# Patient Record
Sex: FEMALE | Race: WHITE | Employment: FULL TIME | URBAN - NONMETROPOLITAN AREA
[De-identification: names, ages, dates, MRNs, and addresses within clinical notes are randomized per-mention and may not be internally consistent; named-entity substitution may affect disease eponyms.]

---

## 2017-02-07 ENCOUNTER — OFFICE VISIT (OUTPATIENT)
Dept: PRIMARY CARE CLINIC | Age: 27
End: 2017-02-07

## 2017-02-07 VITALS
DIASTOLIC BLOOD PRESSURE: 70 MMHG | WEIGHT: 125.8 LBS | HEIGHT: 61 IN | SYSTOLIC BLOOD PRESSURE: 110 MMHG | HEART RATE: 83 BPM | BODY MASS INDEX: 23.75 KG/M2 | OXYGEN SATURATION: 98 %

## 2017-02-07 DIAGNOSIS — L23.9 ALLERGIC DERMATITIS: Primary | ICD-10-CM

## 2017-02-07 PROCEDURE — 99202 OFFICE O/P NEW SF 15 MIN: CPT | Performed by: FAMILY MEDICINE

## 2017-02-07 RX ORDER — METHYLPREDNISOLONE 4 MG/1
4 TABLET ORAL SEE ADMIN INSTRUCTIONS
Qty: 1 KIT | Refills: 0 | Status: SHIPPED | OUTPATIENT
Start: 2017-02-07 | End: 2017-02-13

## 2017-02-07 RX ORDER — LORATADINE 10 MG/1
10 TABLET ORAL DAILY PRN
Qty: 15 TABLET | Refills: 0 | Status: SHIPPED | OUTPATIENT
Start: 2017-02-07 | End: 2017-02-22

## 2017-02-07 RX ORDER — DULOXETIN HYDROCHLORIDE 30 MG/1
CAPSULE, DELAYED RELEASE ORAL
COMMUNITY
Start: 2016-11-14 | End: 2019-03-25

## 2017-02-24 ENCOUNTER — TELEPHONE (OUTPATIENT)
Dept: OPTOMETRY | Age: 27
End: 2017-02-24

## 2017-06-21 ENCOUNTER — HOSPITAL ENCOUNTER (EMERGENCY)
Age: 27
Discharge: HOME | End: 2017-06-22
Attending: EMERGENCY MEDICINE
Payer: COMMERCIAL

## 2017-06-21 VITALS
SYSTOLIC BLOOD PRESSURE: 113 MMHG | OXYGEN SATURATION: 100 % | TEMPERATURE: 102 F | HEIGHT: 62 IN | RESPIRATION RATE: 14 BRPM | DIASTOLIC BLOOD PRESSURE: 62 MMHG | WEIGHT: 123 LBS | HEART RATE: 99 BPM | BODY MASS INDEX: 22.63 KG/M2

## 2017-06-21 DIAGNOSIS — J02.9 ACUTE PHARYNGITIS, UNSPECIFIED ETIOLOGY: Primary | ICD-10-CM

## 2017-06-21 PROCEDURE — 6370000000 HC RX 637 (ALT 250 FOR IP): Performed by: EMERGENCY MEDICINE

## 2017-06-21 PROCEDURE — 99283 EMERGENCY DEPT VISIT LOW MDM: CPT

## 2017-06-21 RX ORDER — IBUPROFEN 600 MG/1
600 TABLET ORAL ONCE
Status: COMPLETED | OUTPATIENT
Start: 2017-06-22 | End: 2017-06-21

## 2017-06-21 RX ADMIN — IBUPROFEN 600 MG: 600 TABLET, FILM COATED ORAL at 23:51

## 2017-06-21 ASSESSMENT — PAIN DESCRIPTION - FREQUENCY: FREQUENCY: CONTINUOUS

## 2017-06-21 ASSESSMENT — PAIN DESCRIPTION - PROGRESSION: CLINICAL_PROGRESSION: GRADUALLY WORSENING

## 2017-06-21 ASSESSMENT — PAIN SCALES - GENERAL
PAINLEVEL_OUTOF10: 7
PAINLEVEL_OUTOF10: 7

## 2017-06-21 ASSESSMENT — PAIN DESCRIPTION - PAIN TYPE: TYPE: ACUTE PAIN

## 2017-06-21 ASSESSMENT — PAIN DESCRIPTION - DESCRIPTORS: DESCRIPTORS: ACHING

## 2017-06-21 ASSESSMENT — PAIN DESCRIPTION - ONSET: ONSET: SUDDEN

## 2017-06-22 VITALS
HEART RATE: 97 BPM | DIASTOLIC BLOOD PRESSURE: 66 MMHG | TEMPERATURE: 98.6 F | OXYGEN SATURATION: 98 % | RESPIRATION RATE: 18 BRPM | SYSTOLIC BLOOD PRESSURE: 113 MMHG

## 2017-06-22 DIAGNOSIS — J02.9 ACUTE PHARYNGITIS, UNSPECIFIED ETIOLOGY: Primary | ICD-10-CM

## 2017-06-22 LAB
DIRECT EXAM: NORMAL
Lab: NORMAL
Lab: NORMAL
SPECIMEN DESCRIPTION: NORMAL
SPECIMEN DESCRIPTION: NORMAL
STATUS: NORMAL
STATUS: NORMAL

## 2017-06-22 PROCEDURE — 6370000000 HC RX 637 (ALT 250 FOR IP): Performed by: EMERGENCY MEDICINE

## 2017-06-22 PROCEDURE — 99282 EMERGENCY DEPT VISIT SF MDM: CPT

## 2017-06-22 PROCEDURE — 87651 STREP A DNA AMP PROBE: CPT

## 2017-06-22 RX ORDER — PENICILLIN V POTASSIUM 250 MG/1
TABLET ORAL
Status: DISCONTINUED
Start: 2017-06-22 | End: 2017-06-22 | Stop reason: HOSPADM

## 2017-06-22 RX ORDER — PENICILLIN V POTASSIUM 500 MG/1
500 TABLET ORAL 4 TIMES DAILY
Qty: 28 TABLET | Refills: 0 | Status: SHIPPED | OUTPATIENT
Start: 2017-06-22 | End: 2017-06-29

## 2017-06-22 RX ORDER — PREDNISONE 20 MG/1
60 TABLET ORAL ONCE
Status: COMPLETED | OUTPATIENT
Start: 2017-06-22 | End: 2017-06-22

## 2017-06-22 RX ORDER — PREDNISONE 50 MG/1
50 TABLET ORAL DAILY
Qty: 4 TABLET | Refills: 0 | Status: SHIPPED | OUTPATIENT
Start: 2017-06-22 | End: 2017-06-26

## 2017-06-22 RX ORDER — PENICILLIN V POTASSIUM 500 MG/1
500 TABLET ORAL 2 TIMES DAILY
Qty: 20 TABLET | Refills: 0 | Status: SHIPPED | OUTPATIENT
Start: 2017-06-22 | End: 2017-07-02

## 2017-06-22 RX ORDER — PENICILLIN V POTASSIUM 250 MG/1
500 TABLET ORAL ONCE
Status: COMPLETED | OUTPATIENT
Start: 2017-06-22 | End: 2017-06-22

## 2017-06-22 RX ADMIN — PREDNISONE 60 MG: 20 TABLET ORAL at 23:30

## 2017-06-22 RX ADMIN — PENICILLIN V POTASIUM 500 MG: 250 TABLET ORAL at 00:15

## 2017-06-22 ASSESSMENT — PAIN SCALES - GENERAL
PAINLEVEL_OUTOF10: 4
PAINLEVEL_OUTOF10: 5
PAINLEVEL_OUTOF10: 5

## 2017-06-22 ASSESSMENT — ENCOUNTER SYMPTOMS
VOMITING: 0
NAUSEA: 0
SHORTNESS OF BREATH: 0
SORE THROAT: 1

## 2017-06-22 ASSESSMENT — PAIN DESCRIPTION - PAIN TYPE: TYPE: ACUTE PAIN

## 2017-06-22 ASSESSMENT — PAIN DESCRIPTION - FREQUENCY: FREQUENCY: CONTINUOUS

## 2017-06-22 ASSESSMENT — PAIN - FUNCTIONAL ASSESSMENT: PAIN_FUNCTIONAL_ASSESSMENT: 0-10

## 2017-06-22 ASSESSMENT — PAIN DESCRIPTION - LOCATION: LOCATION: THROAT

## 2017-11-01 ENCOUNTER — TELEPHONE (OUTPATIENT)
Dept: OPTOMETRY | Age: 27
End: 2017-11-01

## 2017-11-06 ENCOUNTER — OFFICE VISIT (OUTPATIENT)
Dept: OPTOMETRY | Age: 27
End: 2017-11-06
Payer: COMMERCIAL

## 2017-11-06 DIAGNOSIS — H52.203 MYOPIA OF BOTH EYES WITH ASTIGMATISM: Primary | ICD-10-CM

## 2017-11-06 DIAGNOSIS — H52.13 MYOPIA OF BOTH EYES WITH ASTIGMATISM: Primary | ICD-10-CM

## 2017-11-06 PROCEDURE — 92014 COMPRE OPH EXAM EST PT 1/>: CPT | Performed by: OPTOMETRIST

## 2017-11-06 RX ORDER — BENOXINATE HCL/FLUORESCEIN SOD 0.4%-0.25%
1 DROPS OPHTHALMIC (EYE) ONCE
Status: COMPLETED | OUTPATIENT
Start: 2017-11-06 | End: 2017-11-06

## 2017-11-06 RX ADMIN — Medication 1 DROP: at 17:06

## 2017-11-06 ASSESSMENT — REFRACTION_WEARINGRX
OS_SPHERE: -1.25
OD_SPHERE: -1.25
OS_CYLINDER: -0.50
OS_AXIS: 170
OD_AXIS: 180
OD_CYLINDER: -0.50

## 2017-11-06 ASSESSMENT — TONOMETRY
OS_IOP_MMHG: 18
OD_IOP_MMHG: 18
IOP_METHOD: APPLANATION W FLURESS DROP

## 2017-11-06 ASSESSMENT — REFRACTION_MANIFEST
OS_SPHERE: -1.25
OS_AXIS: 170
OD_SPHERE: -1.50
OD_CYLINDER: -0.50
OS_CYLINDER: -0.50
OD_AXIS: 180

## 2017-11-06 ASSESSMENT — VISUAL ACUITY
OD_CC+: -1
OS_CC: 20/20
CORRECTION_TYPE: CONTACTS
METHOD: SNELLEN - LINEAR

## 2017-11-06 ASSESSMENT — REFRACTION_CURRENTRX
OD_BRAND: VISTAKON: ACUVUE OASYS
OS_BRAND: VISTAKON: ACUVUE OASYS
OS_BASECURVE: 8.4
OD_BASECURVE: 8.4
OD_SPHERE: -1.75
OS_SPHERE: -1.75

## 2017-11-06 ASSESSMENT — SLIT LAMP EXAM - LIDS
COMMENTS: NORMAL
COMMENTS: NORMAL

## 2017-11-06 ASSESSMENT — KERATOMETRY
OS_K1POWER_DIOPTERS: 44.25
OS_AXISANGLE2_DEGREES: 177
OS_K2POWER_DIOPTERS: 45.25
OS_AXISANGLE_DEGREES: 087

## 2017-11-06 NOTE — PROGRESS NOTES
Elsy Das presents today for   Chief Complaint   Patient presents with    Blurred Vision    Vision Exam   .    HPI     Blurred Vision   In both eyes. Vision is blurred. Context:  distance vision. Comments   Last Vision Exam: 9-8-2016 Aw  Last Ophthalmology Exam: n/a  Last Filled Glasses Rx: ? Insurance: [employer]  Update: Contacts; was given a trial pair on Wednesday  Does not like the Acuvue Oasys cotnacts; she is able to wear them for a day then they get all foggy. Distance is all blurry.                     Main Ophthalmology Exam     Slit Lamp Exam       Right Left    Lids/Lashes Normal Normal    Conjunctiva/Sclera White and quiet White and quiet    Cornea Clear Clear    Anterior Chamber Deep and quiet Deep and quiet    Iris Round and reactive Round and reactive    Lens Clear Clear    Vitreous Normal Normal          Fundus Exam       Right Left    Disc Normal Normal    C/D Ratio 0.15 0.15    Macula Normal Normal    Vessels Normal Normal    78 diopter                   Tonometry     Tonometry (Applanation w Fluress drop, 5:14 PM)       Right Left    Pressure 18 18                  Visual Acuity (Snellen - Linear)       Right Left    Dist cc 20/25 -1 20/20    Correction:  Contacts        Keratometry     Keratometry       K1 Axis K2 Axis    Right        Left 44.25 177 45.25 087                Ophthalmology Exam     Wearing Rx       Sphere Cylinder Axis    Right -1.25 -0.50 180    Left -1.25 -0.50 170    Age:  3yr    Type:  SVL NOT FILLED                Manifest Refraction     Manifest Refraction       Sphere Cylinder [place] Dist [place]    Right -1.50 -0.50 180 20/20    Left -1.25 -0.50 170 20/20          Manifest Refraction #2 (Auto)       Sphere Cylinder [place] Dist VA    Right -1.50 -0.50 006     Left -0.75 -0.50 168                       Contact Lens Current Rx     Current Contact Lens Rx       Brand Base Curve Sphere    Right [place]: Acuvue Oasys 8.4 -1.75    Left [place]: Acuvue Oasys 8.4

## 2017-11-09 ENCOUNTER — OFFICE VISIT (OUTPATIENT)
Dept: OPTOMETRY | Age: 27
End: 2017-11-09
Payer: COMMERCIAL

## 2017-11-09 DIAGNOSIS — H10.31 ACUTE CONJUNCTIVITIS OF RIGHT EYE, UNSPECIFIED ACUTE CONJUNCTIVITIS TYPE: Primary | ICD-10-CM

## 2017-11-09 PROCEDURE — G8427 DOCREV CUR MEDS BY ELIG CLIN: HCPCS | Performed by: OPTOMETRIST

## 2017-11-09 PROCEDURE — G8420 CALC BMI NORM PARAMETERS: HCPCS | Performed by: OPTOMETRIST

## 2017-11-09 PROCEDURE — 99213 OFFICE O/P EST LOW 20 MIN: CPT | Performed by: OPTOMETRIST

## 2017-11-09 PROCEDURE — 4004F PT TOBACCO SCREEN RCVD TLK: CPT | Performed by: OPTOMETRIST

## 2017-11-09 PROCEDURE — G8484 FLU IMMUNIZE NO ADMIN: HCPCS | Performed by: OPTOMETRIST

## 2017-11-09 RX ORDER — POLYMYXIN B SULFATE AND TRIMETHOPRIM 1; 10000 MG/ML; [USP'U]/ML
1 SOLUTION OPHTHALMIC EVERY 4 HOURS
Qty: 1 BOTTLE | Refills: 0 | Status: SHIPPED | OUTPATIENT
Start: 2017-11-09 | End: 2017-11-19

## 2017-11-09 ASSESSMENT — VISUAL ACUITY
OD_SC: 20/60
OS_SC: 20/60
METHOD: SNELLEN - LINEAR

## 2017-11-09 NOTE — PROGRESS NOTES
Oliva Silveira presents today for   Chief Complaint   Patient presents with   [facility] Problem    Red Eye   . HPI     Right eye is red and was stuck shut this morning; her other eye is also starting to have pus come out as well; right eye is tender to the touch; started last night  When she wears the contacts they burn her eyes. States the left eye is having puss too                Main Ophthalmology Exam     Slit Lamp Exam       Right Left    Lids/Lashes mucous discharge on lashes      Conjunctiva/Sclera 2+ Chemosis, 2+ Injection     Cornea Clear     Anterior Chamber Deep and quiet     Iris Round and reactive     Lens Clear     Vitreous Normal                     Not recorded         Visual Acuity (Snellen - Linear)       Right Left    Dist sc 20/60 20/60                1. Acute conjunctivitis of right eye, unspecified acute conjunctivitis type           Patient Instructions   Do not wear the contact lenses. Use medication as prescribed  - trimethoprim-polymyxin b (POLYTRIM) 70615-3.1 UNIT/ML-% ophthalmic solution; Place 1 drop into the right eye every 4 hours for 10 days  Dispense: 1 Bottle; Refill: 0         Return in about 1 week (around 11/16/2017) for red eye follow up .

## 2017-11-16 ENCOUNTER — OFFICE VISIT (OUTPATIENT)
Dept: OPTOMETRY | Age: 27
End: 2017-11-16
Payer: COMMERCIAL

## 2017-11-16 DIAGNOSIS — H10.31 ACUTE CONJUNCTIVITIS OF RIGHT EYE, UNSPECIFIED ACUTE CONJUNCTIVITIS TYPE: Primary | ICD-10-CM

## 2017-11-16 PROCEDURE — G8427 DOCREV CUR MEDS BY ELIG CLIN: HCPCS | Performed by: OPTOMETRIST

## 2017-11-16 PROCEDURE — G8484 FLU IMMUNIZE NO ADMIN: HCPCS | Performed by: OPTOMETRIST

## 2017-11-16 PROCEDURE — G8420 CALC BMI NORM PARAMETERS: HCPCS | Performed by: OPTOMETRIST

## 2017-11-16 PROCEDURE — 4004F PT TOBACCO SCREEN RCVD TLK: CPT | Performed by: OPTOMETRIST

## 2017-11-16 PROCEDURE — 99212 OFFICE O/P EST SF 10 MIN: CPT | Performed by: OPTOMETRIST

## 2017-11-16 ASSESSMENT — VISUAL ACUITY
OD_SC: 20/60
METHOD: SNELLEN - LINEAR
OS_SC: 20/50

## 2017-11-16 ASSESSMENT — SLIT LAMP EXAM - LIDS: COMMENTS: NORMAL

## 2017-11-16 NOTE — PROGRESS NOTES
Ron Courser presents today for   Chief Complaint   Patient presents with    Follow-up   . HPI     1 week follow up red eye  Given Polytrim to use 1 drop every 4 hours for 10 days; everything is all cleared up, stopped used drops when eyes cleared up. Main Ophthalmology Exam     External Exam       Right Left    External Normal Normal          Slit Lamp Exam       Right Left    Lids/Lashes Normal     Conjunctiva/Sclera Trace Injection     Cornea no staining      Anterior Chamber Deep and quiet     Iris Round and reactive     Lens Clear     Vitreous Normal                       Visual Acuity (Snellen - Linear)       Right Left    Dist sc 20/60 20/50                         1. Acute conjunctivitis of right eye, unspecified acute conjunctivitis type           Patient Instructions   D/c drops  In one week try new contact lenses  Trials given today;  Limit wear. 2 week contact lens check      Return in about 1 week (around 11/23/2017) for contact lens follow-up.

## 2017-11-30 ENCOUNTER — OFFICE VISIT (OUTPATIENT)
Dept: OPTOMETRY | Age: 27
End: 2017-11-30
Payer: COMMERCIAL

## 2017-11-30 DIAGNOSIS — H52.13 MYOPIA OF BOTH EYES: ICD-10-CM

## 2017-11-30 DIAGNOSIS — H10.30 ACUTE CONJUNCTIVITIS, UNSPECIFIED ACUTE CONJUNCTIVITIS TYPE, UNSPECIFIED LATERALITY: Primary | ICD-10-CM

## 2017-11-30 PROCEDURE — 4004F PT TOBACCO SCREEN RCVD TLK: CPT | Performed by: OPTOMETRIST

## 2017-11-30 PROCEDURE — G8420 CALC BMI NORM PARAMETERS: HCPCS | Performed by: OPTOMETRIST

## 2017-11-30 PROCEDURE — G8427 DOCREV CUR MEDS BY ELIG CLIN: HCPCS | Performed by: OPTOMETRIST

## 2017-11-30 PROCEDURE — G8484 FLU IMMUNIZE NO ADMIN: HCPCS | Performed by: OPTOMETRIST

## 2017-11-30 PROCEDURE — 99212 OFFICE O/P EST SF 10 MIN: CPT | Performed by: OPTOMETRIST

## 2017-11-30 ASSESSMENT — REFRACTION_CURRENTRX
OD_SPHERE: -1.75
OS_SPHERE: -1.75

## 2017-11-30 ASSESSMENT — VISUAL ACUITY
CORRECTION_TYPE: CONTACTS
METHOD: SNELLEN - LINEAR
OS_CC: 20/25

## 2017-11-30 NOTE — PROGRESS NOTES
Francisca Coley presents today for   Chief Complaint   Patient presents with    Follow-up   . HPI     2 wk contact lens follow up  Likes what she is wearing; states she was given bio francoise dailies because other brand was out. Visual Acuity (Snellen - Linear)       Right Left    Dist cc 20/30 20/25    Correction:  Contacts          Contact Lens Current Rx     Current Contact Lens Rx (Trial Lens)       Brand Sphere    Right [employer] biotru daily  -1.75    Left [employer] biotru daily  -1.75                FINAL RX  Final Contact Lens Rx       Brand Sphere    Right Acuvue franklyn  -1.75    Left acuvue franklyn  -1.75    Expiration Date:  12/1/2018    Replacement:  Monthly    Wearing Schedule:  Daily wear      Final Contact Lens Rx #2       Brand Sphere    Right [employer] biotru daily  -1.75    Left [employer] biotru daily  -1.75    Expiration Date:  12/13/2018    Replacement:  Daily    Wearing Schedule:  Daily wear      Final Contact Lens Rx Comments     Final          ORDER call      1. Acute conjunctivitis, unspecified acute conjunctivitis type, unspecified laterality    2.  Myopia of both eyes         Patient Instructions   New contact lenses were given; ok to call and order      Return in about 1 year (around 11/30/2018) for complete eye exam.

## 2017-12-11 ENCOUNTER — TELEPHONE (OUTPATIENT)
Dept: OPTOMETRY | Age: 27
End: 2017-12-11

## 2017-12-11 NOTE — TELEPHONE ENCOUNTER
Patient stated that she does not like the Acuvue contacts. She stated that they only seem to be good for 2 weeks and then they are blurry and she can not see. Patient stated that the Paulino Calderon 27 seem to work best for her, and she is willing to spend the extra money for them, if they are what she needs. Patient has been seen for conjunctivitis on 11/06/17, and has tried different acuvue contacts since then. How would you like to proceed?

## 2017-12-21 ENCOUNTER — TELEPHONE (OUTPATIENT)
Dept: OPTOMETRY | Age: 27
End: 2017-12-21

## 2018-05-18 ENCOUNTER — APPOINTMENT (OUTPATIENT)
Dept: INTERVENTIONAL RADIOLOGY/VASCULAR | Age: 28
End: 2018-05-18
Payer: MEDICAID

## 2018-05-18 ENCOUNTER — HOSPITAL ENCOUNTER (EMERGENCY)
Age: 28
Discharge: HOME | End: 2018-05-18
Attending: EMERGENCY MEDICINE
Payer: MEDICAID

## 2018-05-18 ENCOUNTER — APPOINTMENT (OUTPATIENT)
Dept: ULTRASOUND IMAGING | Age: 28
End: 2018-05-18
Payer: MEDICAID

## 2018-05-18 ENCOUNTER — TELEPHONE (OUTPATIENT)
Dept: PRIMARY CARE CLINIC | Age: 28
End: 2018-05-18

## 2018-05-18 VITALS
OXYGEN SATURATION: 99 % | WEIGHT: 123 LBS | BODY MASS INDEX: 22.86 KG/M2 | SYSTOLIC BLOOD PRESSURE: 98 MMHG | HEART RATE: 77 BPM | TEMPERATURE: 98.8 F | RESPIRATION RATE: 16 BRPM | DIASTOLIC BLOOD PRESSURE: 63 MMHG

## 2018-05-18 DIAGNOSIS — O26.892 ABDOMINAL PAIN IN PREGNANCY, SECOND TRIMESTER: Primary | ICD-10-CM

## 2018-05-18 DIAGNOSIS — R10.9 ABDOMINAL PAIN IN PREGNANCY, SECOND TRIMESTER: Primary | ICD-10-CM

## 2018-05-18 DIAGNOSIS — R82.71 BACTERIURIA DURING PREGNANCY IN SECOND TRIMESTER: ICD-10-CM

## 2018-05-18 DIAGNOSIS — O99.891 BACTERIURIA DURING PREGNANCY IN SECOND TRIMESTER: ICD-10-CM

## 2018-05-18 LAB
-: ABNORMAL
ABSOLUTE EOS #: 0.11 K/UL (ref 0–0.4)
ABSOLUTE IMMATURE GRANULOCYTE: ABNORMAL K/UL (ref 0–0.3)
ABSOLUTE LYMPH #: 1.79 K/UL (ref 1–4.8)
ABSOLUTE MONO #: 0.48 K/UL (ref 0.1–1.2)
ALBUMIN SERPL-MCNC: 3.6 G/DL (ref 3.5–5.2)
ALBUMIN/GLOBULIN RATIO: 1.2 (ref 1–2.5)
ALP BLD-CCNC: 54 U/L (ref 35–104)
ALT SERPL-CCNC: 21 U/L (ref 5–33)
AMORPHOUS: ABNORMAL
ANION GAP SERPL CALCULATED.3IONS-SCNC: 10 MMOL/L (ref 9–17)
AST SERPL-CCNC: 17 U/L
BACTERIA: ABNORMAL
BASOPHILS # BLD: 1 % (ref 0–1)
BASOPHILS ABSOLUTE: 0.07 K/UL (ref 0–0.2)
BILIRUB SERPL-MCNC: 0.19 MG/DL (ref 0.3–1.2)
BILIRUBIN DIRECT: <0.08 MG/DL
BILIRUBIN URINE: NEGATIVE
BILIRUBIN, INDIRECT: ABNORMAL MG/DL (ref 0–1)
BUN BLDV-MCNC: 7 MG/DL (ref 6–20)
BUN/CREAT BLD: 17 (ref 9–20)
CALCIUM SERPL-MCNC: 9 MG/DL (ref 8.6–10.4)
CASTS UA: ABNORMAL /LPF (ref 0–2)
CHLORIDE BLD-SCNC: 102 MMOL/L (ref 98–107)
CO2: 26 MMOL/L (ref 20–31)
COLOR: ABNORMAL
COMMENT UA: ABNORMAL
CREAT SERPL-MCNC: 0.42 MG/DL (ref 0.5–0.9)
CRYSTALS, UA: ABNORMAL /HPF
DIFFERENTIAL TYPE: ABNORMAL
EOSINOPHILS RELATIVE PERCENT: 1 % (ref 1–7)
EPITHELIAL CELLS UA: ABNORMAL /HPF (ref 0–5)
GFR AFRICAN AMERICAN: >60 ML/MIN
GFR NON-AFRICAN AMERICAN: >60 ML/MIN
GFR SERPL CREATININE-BSD FRML MDRD: ABNORMAL ML/MIN/{1.73_M2}
GFR SERPL CREATININE-BSD FRML MDRD: ABNORMAL ML/MIN/{1.73_M2}
GLOBULIN: 2.9 G/DL (ref 1.5–3.8)
GLUCOSE BLD-MCNC: 77 MG/DL (ref 70–99)
GLUCOSE URINE: NEGATIVE
HCT VFR BLD CALC: 34.4 % (ref 36–46)
HEMOGLOBIN: 11.2 G/DL (ref 12–16)
IMMATURE GRANULOCYTES: ABNORMAL %
KETONES, URINE: NEGATIVE
LEUKOCYTE ESTERASE, URINE: ABNORMAL
LYMPHOCYTES # BLD: 15 % (ref 16–46)
MCH RBC QN AUTO: 29.3 PG (ref 26–34)
MCHC RBC AUTO-ENTMCNC: 32.6 G/DL (ref 31–37)
MCV RBC AUTO: 89.9 FL (ref 80–100)
MONOCYTES # BLD: 4 % (ref 4–11)
MUCUS: ABNORMAL
NITRITE, URINE: NEGATIVE
NRBC AUTOMATED: ABNORMAL PER 100 WBC
OTHER OBSERVATIONS UA: ABNORMAL
PDW BLD-RTO: 12.4 % (ref 11–14.5)
PH UA: 7 (ref 5–6)
PLATELET # BLD: 229 K/UL (ref 140–450)
PLATELET ESTIMATE: ABNORMAL
PMV BLD AUTO: 8.8 FL (ref 6–12)
POTASSIUM SERPL-SCNC: 3.7 MMOL/L (ref 3.7–5.3)
PROTEIN UA: NEGATIVE
RBC # BLD: 3.83 M/UL (ref 4–5.2)
RBC # BLD: ABNORMAL 10*6/UL
RBC UA: ABNORMAL /HPF (ref 0–4)
RENAL EPITHELIAL, UA: ABNORMAL /HPF
SEG NEUTROPHILS: 79 % (ref 43–77)
SEGMENTED NEUTROPHILS ABSOLUTE COUNT: 9.35 K/UL (ref 1.8–7.7)
SODIUM BLD-SCNC: 138 MMOL/L (ref 135–144)
SPECIFIC GRAVITY UA: 1.01 (ref 1.01–1.02)
TOTAL PROTEIN: 6.5 G/DL (ref 6.4–8.3)
TRICHOMONAS: ABNORMAL
TURBIDITY: ABNORMAL
URINE HGB: NEGATIVE
UROBILINOGEN, URINE: NORMAL
WBC # BLD: 11.8 K/UL (ref 3.5–11)
WBC # BLD: ABNORMAL 10*3/UL
WBC UA: ABNORMAL /HPF (ref 0–4)
YEAST: ABNORMAL

## 2018-05-18 PROCEDURE — 80076 HEPATIC FUNCTION PANEL: CPT

## 2018-05-18 PROCEDURE — 76775 US EXAM ABDO BACK WALL LIM: CPT

## 2018-05-18 PROCEDURE — 85025 COMPLETE CBC W/AUTO DIFF WBC: CPT

## 2018-05-18 PROCEDURE — 76805 OB US >/= 14 WKS SNGL FETUS: CPT

## 2018-05-18 PROCEDURE — 81001 URINALYSIS AUTO W/SCOPE: CPT

## 2018-05-18 PROCEDURE — 80048 BASIC METABOLIC PNL TOTAL CA: CPT

## 2018-05-18 PROCEDURE — 87088 URINE BACTERIA CULTURE: CPT

## 2018-05-18 PROCEDURE — 87086 URINE CULTURE/COLONY COUNT: CPT

## 2018-05-18 PROCEDURE — 36415 COLL VENOUS BLD VENIPUNCTURE: CPT

## 2018-05-18 PROCEDURE — 99284 EMERGENCY DEPT VISIT MOD MDM: CPT

## 2018-05-18 PROCEDURE — 87186 SC STD MICRODIL/AGAR DIL: CPT

## 2018-05-18 RX ORDER — CEPHALEXIN 500 MG/1
500 CAPSULE ORAL 3 TIMES DAILY
Qty: 15 CAPSULE | Refills: 0 | Status: SHIPPED | OUTPATIENT
Start: 2018-05-18 | End: 2018-05-23

## 2018-05-18 ASSESSMENT — ENCOUNTER SYMPTOMS
CONSTIPATION: 0
ABDOMINAL PAIN: 1
DIARRHEA: 0
VOMITING: 0
EYE PAIN: 0
COUGH: 0
BACK PAIN: 0
SHORTNESS OF BREATH: 0
BLOOD IN STOOL: 0
NAUSEA: 0

## 2018-05-18 ASSESSMENT — PAIN DESCRIPTION - LOCATION: LOCATION: ABDOMEN

## 2018-05-18 ASSESSMENT — PAIN DESCRIPTION - PAIN TYPE: TYPE: ACUTE PAIN

## 2018-05-20 LAB
CULTURE: ABNORMAL
CULTURE: ABNORMAL
Lab: ABNORMAL
ORGANISM: ABNORMAL
SPECIMEN DESCRIPTION: ABNORMAL
SPECIMEN DESCRIPTION: ABNORMAL
STATUS: ABNORMAL

## 2019-01-16 ENCOUNTER — OFFICE VISIT (OUTPATIENT)
Dept: OPTOMETRY | Age: 29
End: 2019-01-16
Payer: COMMERCIAL

## 2019-01-16 DIAGNOSIS — H52.203 MYOPIA OF BOTH EYES WITH ASTIGMATISM: Primary | ICD-10-CM

## 2019-01-16 DIAGNOSIS — H52.13 MYOPIA OF BOTH EYES WITH ASTIGMATISM: Primary | ICD-10-CM

## 2019-01-16 PROCEDURE — 92014 COMPRE OPH EXAM EST PT 1/>: CPT | Performed by: OPTOMETRIST

## 2019-01-16 ASSESSMENT — REFRACTION_WEARINGRX
OD_CYLINDER: -0.50
OS_CYLINDER: -0.50
OD_SPHERE: -1.25
OS_AXIS: 170
OS_SPHERE: -1.25
SPECS_TYPE: SVL
OD_AXIS: 180

## 2019-01-16 ASSESSMENT — REFRACTION_MANIFEST
OD_CYLINDER: -0.50
OS_CYLINDER: -0.50
OS_SPHERE: -1.00
OD_SPHERE: -1.00
OD_AXIS: 180
OS_AXIS: 175

## 2019-01-16 ASSESSMENT — SLIT LAMP EXAM - LIDS
COMMENTS: NORMAL
COMMENTS: NORMAL

## 2019-01-16 ASSESSMENT — VISUAL ACUITY
OS_CC: 20/25
CORRECTION_TYPE: CONTACTS
METHOD: SNELLEN - LINEAR

## 2019-01-16 ASSESSMENT — TONOMETRY
IOP_METHOD: NON-CONTACT AIR PUFF
OD_IOP_MMHG: 17
OS_IOP_MMHG: 16

## 2019-01-16 ASSESSMENT — REFRACTION_CURRENTRX
OD_SPHERE: -1.75
OS_SPHERE: -1.75

## 2019-03-20 DIAGNOSIS — N94.6 MENORRHALGIA: Primary | ICD-10-CM

## 2019-03-20 DIAGNOSIS — Z32.00 UNCONFIRMED PREGNANCY: ICD-10-CM

## 2019-03-25 ENCOUNTER — INITIAL PRENATAL (OUTPATIENT)
Dept: OBGYN | Age: 29
End: 2019-03-25
Payer: MEDICAID

## 2019-03-25 ENCOUNTER — HOSPITAL ENCOUNTER (OUTPATIENT)
Dept: LAB | Age: 29
Discharge: HOME | End: 2019-03-25
Payer: MEDICAID

## 2019-03-25 VITALS
HEART RATE: 68 BPM | DIASTOLIC BLOOD PRESSURE: 60 MMHG | WEIGHT: 148 LBS | BODY MASS INDEX: 27.51 KG/M2 | SYSTOLIC BLOOD PRESSURE: 110 MMHG

## 2019-03-25 DIAGNOSIS — Z36.87 UNSURE OF LMP (LAST MENSTRUAL PERIOD) AS REASON FOR ULTRASOUND SCAN: ICD-10-CM

## 2019-03-25 DIAGNOSIS — Z34.91 PRENATAL CARE IN FIRST TRIMESTER: Primary | ICD-10-CM

## 2019-03-25 DIAGNOSIS — Z34.91 PRENATAL CARE IN FIRST TRIMESTER: ICD-10-CM

## 2019-03-25 DIAGNOSIS — Z32.00 UNCONFIRMED PREGNANCY: ICD-10-CM

## 2019-03-25 LAB
-: ABNORMAL
ABO/RH: NORMAL
ABSOLUTE EOS #: 0.1 K/UL (ref 0–0.4)
ABSOLUTE IMMATURE GRANULOCYTE: NORMAL K/UL (ref 0–0.3)
ABSOLUTE LYMPH #: 2.3 K/UL (ref 1–4.8)
ABSOLUTE MONO #: 0.6 K/UL (ref 0.1–1.2)
AMORPHOUS: ABNORMAL
AMPHETAMINE SCREEN URINE: NEGATIVE
ANTIBODY SCREEN: NEGATIVE
BACTERIA: ABNORMAL
BARBITURATE SCREEN URINE: NEGATIVE
BASOPHILS # BLD: 0 % (ref 0–1)
BASOPHILS ABSOLUTE: 0 K/UL (ref 0–0.2)
BENZODIAZEPINE SCREEN, URINE: NEGATIVE
BILIRUBIN URINE: NEGATIVE
BUPRENORPHINE URINE: NEGATIVE
CANNABINOID SCREEN URINE: NEGATIVE
CASTS UA: ABNORMAL /LPF (ref 0–2)
COCAINE METABOLITE, URINE: NEGATIVE
COLOR: ABNORMAL
COMMENT UA: ABNORMAL
CRYSTALS, UA: ABNORMAL /HPF
DIFFERENTIAL TYPE: NORMAL
EOSINOPHILS RELATIVE PERCENT: 1 % (ref 1–7)
EPITHELIAL CELLS UA: ABNORMAL /HPF (ref 0–5)
GLUCOSE URINE: NEGATIVE
HCG QUALITATIVE: POSITIVE
HCT VFR BLD CALC: 40.6 % (ref 36–46)
HEMOGLOBIN: 13.3 G/DL (ref 12–16)
HEPATITIS B SURFACE ANTIGEN: NONREACTIVE
HIV AG/AB: NONREACTIVE
IMMATURE GRANULOCYTES: NORMAL %
KETONES, URINE: NEGATIVE
LEUKOCYTE ESTERASE, URINE: NEGATIVE
LYMPHOCYTES # BLD: 24 % (ref 16–46)
MCH RBC QN AUTO: 29.1 PG (ref 26–34)
MCHC RBC AUTO-ENTMCNC: 32.8 G/DL (ref 31–37)
MCV RBC AUTO: 88.7 FL (ref 80–100)
MDMA URINE: NORMAL
METHADONE SCREEN, URINE: NEGATIVE
METHAMPHETAMINE, URINE: NEGATIVE
MONOCYTES # BLD: 7 % (ref 4–11)
MUCUS: ABNORMAL
NITRITE, URINE: NEGATIVE
NRBC AUTOMATED: NORMAL PER 100 WBC
OPIATES, URINE: NEGATIVE
OTHER OBSERVATIONS UA: ABNORMAL
OXYCODONE SCREEN URINE: NEGATIVE
PDW BLD-RTO: 13.9 % (ref 11–14.5)
PH UA: 8 (ref 5–6)
PHENCYCLIDINE, URINE: NEGATIVE
PLATELET # BLD: 232 K/UL (ref 140–450)
PLATELET ESTIMATE: NORMAL
PMV BLD AUTO: 8.6 FL (ref 6–12)
PROPOXYPHENE, URINE: NEGATIVE
PROTEIN UA: NEGATIVE
RBC # BLD: 4.58 M/UL (ref 4–5.2)
RBC # BLD: NORMAL 10*6/UL
RBC UA: ABNORMAL /HPF (ref 0–4)
RENAL EPITHELIAL, UA: ABNORMAL /HPF
RUBV IGG SER QL: 38.2 IU/ML
SEG NEUTROPHILS: 68 % (ref 43–77)
SEGMENTED NEUTROPHILS ABSOLUTE COUNT: 6.3 K/UL (ref 1.8–7.7)
SPECIFIC GRAVITY UA: 1.01 (ref 1.01–1.02)
T. PALLIDUM, IGG: NONREACTIVE
TEST INFORMATION: NORMAL
TRICHOMONAS: ABNORMAL
TRICYCLIC ANTIDEPRESSANTS, UR: NEGATIVE
TURBIDITY: ABNORMAL
URINE HGB: NEGATIVE
UROBILINOGEN, URINE: NORMAL
WBC # BLD: 9.3 K/UL (ref 3.5–11)
WBC # BLD: NORMAL 10*3/UL
WBC UA: ABNORMAL /HPF (ref 0–4)
YEAST: ABNORMAL

## 2019-03-25 PROCEDURE — 85025 COMPLETE CBC W/AUTO DIFF WBC: CPT

## 2019-03-25 PROCEDURE — 36415 COLL VENOUS BLD VENIPUNCTURE: CPT

## 2019-03-25 PROCEDURE — 87086 URINE CULTURE/COLONY COUNT: CPT

## 2019-03-25 PROCEDURE — 87591 N.GONORRHOEAE DNA AMP PROB: CPT

## 2019-03-25 PROCEDURE — 87491 CHLMYD TRACH DNA AMP PROBE: CPT

## 2019-03-25 PROCEDURE — 86850 RBC ANTIBODY SCREEN: CPT

## 2019-03-25 PROCEDURE — 86762 RUBELLA ANTIBODY: CPT

## 2019-03-25 PROCEDURE — 86780 TREPONEMA PALLIDUM: CPT

## 2019-03-25 PROCEDURE — 84703 CHORIONIC GONADOTROPIN ASSAY: CPT

## 2019-03-25 PROCEDURE — 99203 OFFICE O/P NEW LOW 30 MIN: CPT | Performed by: ADVANCED PRACTICE MIDWIFE

## 2019-03-25 PROCEDURE — 87340 HEPATITIS B SURFACE AG IA: CPT

## 2019-03-25 PROCEDURE — 86787 VARICELLA-ZOSTER ANTIBODY: CPT

## 2019-03-25 PROCEDURE — 86901 BLOOD TYPING SEROLOGIC RH(D): CPT

## 2019-03-25 PROCEDURE — 80306 DRUG TEST PRSMV INSTRMNT: CPT

## 2019-03-25 PROCEDURE — 81001 URINALYSIS AUTO W/SCOPE: CPT

## 2019-03-25 PROCEDURE — 87389 HIV-1 AG W/HIV-1&-2 AB AG IA: CPT

## 2019-03-25 PROCEDURE — 86900 BLOOD TYPING SEROLOGIC ABO: CPT

## 2019-03-25 ASSESSMENT — PATIENT HEALTH QUESTIONNAIRE - PHQ9
SUM OF ALL RESPONSES TO PHQ QUESTIONS 1-9: 0
2. FEELING DOWN, DEPRESSED OR HOPELESS: 0
SUM OF ALL RESPONSES TO PHQ9 QUESTIONS 1 & 2: 0
SUM OF ALL RESPONSES TO PHQ QUESTIONS 1-9: 0
1. LITTLE INTEREST OR PLEASURE IN DOING THINGS: 0

## 2019-03-26 LAB
C. TRACHOMATIS DNA ,URINE: NEGATIVE
CULTURE: NO GROWTH
Lab: NORMAL
N. GONORRHOEAE DNA, URINE: NEGATIVE
SPECIMEN DESCRIPTION: NORMAL
SPECIMEN DESCRIPTION: NORMAL

## 2019-03-27 LAB — VZV IGG SER QL IA: 1.67

## 2019-04-01 ENCOUNTER — HOSPITAL ENCOUNTER (OUTPATIENT)
Age: 29
Setting detail: SPECIMEN
Discharge: HOME | End: 2019-04-01
Payer: MEDICAID

## 2019-04-01 ENCOUNTER — ROUTINE PRENATAL (OUTPATIENT)
Dept: OBGYN | Age: 29
End: 2019-04-01
Payer: MEDICAID

## 2019-04-01 VITALS
HEART RATE: 70 BPM | SYSTOLIC BLOOD PRESSURE: 116 MMHG | DIASTOLIC BLOOD PRESSURE: 70 MMHG | BODY MASS INDEX: 26.95 KG/M2 | WEIGHT: 145 LBS

## 2019-04-01 DIAGNOSIS — B97.89 ACUTE VIRAL SINUSITIS: ICD-10-CM

## 2019-04-01 DIAGNOSIS — J01.90 ACUTE VIRAL SINUSITIS: ICD-10-CM

## 2019-04-01 DIAGNOSIS — Z34.91 PRENATAL CARE IN FIRST TRIMESTER: ICD-10-CM

## 2019-04-01 DIAGNOSIS — R50.9 FEVER, UNSPECIFIED FEVER CAUSE: ICD-10-CM

## 2019-04-01 DIAGNOSIS — R50.9 FEVER, UNSPECIFIED FEVER CAUSE: Primary | ICD-10-CM

## 2019-04-01 LAB
DIRECT EXAM: NORMAL
Lab: NORMAL
SPECIMEN DESCRIPTION: NORMAL

## 2019-04-01 PROCEDURE — 1036F TOBACCO NON-USER: CPT | Performed by: ADVANCED PRACTICE MIDWIFE

## 2019-04-01 PROCEDURE — 87804 INFLUENZA ASSAY W/OPTIC: CPT

## 2019-04-01 PROCEDURE — 99213 OFFICE O/P EST LOW 20 MIN: CPT | Performed by: ADVANCED PRACTICE MIDWIFE

## 2019-04-01 PROCEDURE — G8427 DOCREV CUR MEDS BY ELIG CLIN: HCPCS | Performed by: ADVANCED PRACTICE MIDWIFE

## 2019-04-01 PROCEDURE — G8419 CALC BMI OUT NRM PARAM NOF/U: HCPCS | Performed by: ADVANCED PRACTICE MIDWIFE

## 2019-04-01 RX ORDER — ACETAMINOPHEN 325 MG/1
650 TABLET ORAL EVERY 6 HOURS PRN
Status: ON HOLD | COMMUNITY
End: 2021-01-11 | Stop reason: HOSPADM

## 2019-04-01 RX ORDER — AZITHROMYCIN 250 MG/1
TABLET, FILM COATED ORAL
Qty: 6 TABLET | Refills: 0 | Status: SHIPPED | OUTPATIENT
Start: 2019-04-01 | End: 2019-04-09 | Stop reason: ALTCHOICE

## 2019-04-09 ENCOUNTER — HOSPITAL ENCOUNTER (OUTPATIENT)
Dept: ULTRASOUND IMAGING | Age: 29
Discharge: HOME | End: 2019-04-11
Payer: MEDICAID

## 2019-04-09 ENCOUNTER — ROUTINE PRENATAL (OUTPATIENT)
Dept: OBGYN | Age: 29
End: 2019-04-09
Payer: MEDICAID

## 2019-04-09 VITALS
HEART RATE: 72 BPM | SYSTOLIC BLOOD PRESSURE: 110 MMHG | DIASTOLIC BLOOD PRESSURE: 64 MMHG | WEIGHT: 145 LBS | BODY MASS INDEX: 26.95 KG/M2

## 2019-04-09 DIAGNOSIS — Z34.91 PRENATAL CARE IN FIRST TRIMESTER: ICD-10-CM

## 2019-04-09 DIAGNOSIS — Z3A.08 8 WEEKS GESTATION OF PREGNANCY: ICD-10-CM

## 2019-04-09 DIAGNOSIS — Z34.91 PRENATAL CARE IN FIRST TRIMESTER: Primary | ICD-10-CM

## 2019-04-09 PROCEDURE — 76815 OB US LIMITED FETUS(S): CPT

## 2019-04-09 PROCEDURE — 76817 TRANSVAGINAL US OBSTETRIC: CPT

## 2019-04-09 PROCEDURE — 1036F TOBACCO NON-USER: CPT | Performed by: ADVANCED PRACTICE MIDWIFE

## 2019-04-09 PROCEDURE — G8419 CALC BMI OUT NRM PARAM NOF/U: HCPCS | Performed by: ADVANCED PRACTICE MIDWIFE

## 2019-04-09 PROCEDURE — G8427 DOCREV CUR MEDS BY ELIG CLIN: HCPCS | Performed by: ADVANCED PRACTICE MIDWIFE

## 2019-04-09 PROCEDURE — 99213 OFFICE O/P EST LOW 20 MIN: CPT | Performed by: ADVANCED PRACTICE MIDWIFE

## 2019-05-07 ENCOUNTER — HOSPITAL ENCOUNTER (OUTPATIENT)
Age: 29
Setting detail: SPECIMEN
Discharge: HOME | End: 2019-05-07
Payer: MEDICAID

## 2019-05-07 ENCOUNTER — ROUTINE PRENATAL (OUTPATIENT)
Dept: OBGYN | Age: 29
End: 2019-05-07
Payer: MEDICAID

## 2019-05-07 VITALS
BODY MASS INDEX: 26.95 KG/M2 | DIASTOLIC BLOOD PRESSURE: 66 MMHG | HEART RATE: 78 BPM | SYSTOLIC BLOOD PRESSURE: 112 MMHG | WEIGHT: 145 LBS

## 2019-05-07 DIAGNOSIS — Z34.91 PRENATAL CARE IN FIRST TRIMESTER: ICD-10-CM

## 2019-05-07 DIAGNOSIS — Z3A.12 12 WEEKS GESTATION OF PREGNANCY: ICD-10-CM

## 2019-05-07 DIAGNOSIS — Z34.91 PRENATAL CARE IN FIRST TRIMESTER: Primary | ICD-10-CM

## 2019-05-07 PROCEDURE — G8419 CALC BMI OUT NRM PARAM NOF/U: HCPCS | Performed by: ADVANCED PRACTICE MIDWIFE

## 2019-05-07 PROCEDURE — 87624 HPV HI-RISK TYP POOLED RSLT: CPT

## 2019-05-07 PROCEDURE — G8427 DOCREV CUR MEDS BY ELIG CLIN: HCPCS | Performed by: ADVANCED PRACTICE MIDWIFE

## 2019-05-07 PROCEDURE — G0123 SCREEN CERV/VAG THIN LAYER: HCPCS

## 2019-05-07 PROCEDURE — 99214 OFFICE O/P EST MOD 30 MIN: CPT | Performed by: ADVANCED PRACTICE MIDWIFE

## 2019-05-07 PROCEDURE — 1036F TOBACCO NON-USER: CPT | Performed by: ADVANCED PRACTICE MIDWIFE

## 2019-05-13 ENCOUNTER — TELEPHONE (OUTPATIENT)
Dept: OBGYN | Age: 29
End: 2019-05-13

## 2019-05-13 LAB — CYTOLOGY REPORT: NORMAL

## 2019-05-13 NOTE — TELEPHONE ENCOUNTER
Patient returned call and was notified of [place] prenatal test results by MELISSA Collazo LPN. Patient also wanted to know the sex of the baby, and was informed it was a boy.

## 2019-05-15 LAB
HPV SAMPLE: NORMAL
HPV, GENOTYPE 16: NOT DETECTED
HPV, GENOTYPE 18: NOT DETECTED
HPV, HIGH RISK OTHER: NOT DETECTED
HPV, INTERPRETATION: NORMAL
SPECIMEN DESCRIPTION: NORMAL

## 2019-05-15 NOTE — RESULT ENCOUNTER NOTE
Please notify patient lab results WNL. Repeat pap smear in 12 months with annual exam.  HPV typing negative.   DA/NANCYM

## 2019-06-04 ENCOUNTER — ROUTINE PRENATAL (OUTPATIENT)
Dept: OBGYN | Age: 29
End: 2019-06-04
Payer: MEDICAID

## 2019-06-04 VITALS
WEIGHT: 147 LBS | SYSTOLIC BLOOD PRESSURE: 106 MMHG | DIASTOLIC BLOOD PRESSURE: 76 MMHG | BODY MASS INDEX: 27.33 KG/M2 | HEART RATE: 80 BPM

## 2019-06-04 DIAGNOSIS — K59.01 CONSTIPATION BY DELAYED COLONIC TRANSIT: ICD-10-CM

## 2019-06-04 DIAGNOSIS — Z34.92 PRENATAL CARE, SECOND TRIMESTER: Primary | ICD-10-CM

## 2019-06-04 DIAGNOSIS — Z3A.16 16 WEEKS GESTATION OF PREGNANCY: ICD-10-CM

## 2019-06-04 PROCEDURE — 99213 OFFICE O/P EST LOW 20 MIN: CPT | Performed by: ADVANCED PRACTICE MIDWIFE

## 2019-06-04 PROCEDURE — G8419 CALC BMI OUT NRM PARAM NOF/U: HCPCS | Performed by: ADVANCED PRACTICE MIDWIFE

## 2019-06-04 PROCEDURE — G8427 DOCREV CUR MEDS BY ELIG CLIN: HCPCS | Performed by: ADVANCED PRACTICE MIDWIFE

## 2019-06-04 PROCEDURE — 1036F TOBACCO NON-USER: CPT | Performed by: ADVANCED PRACTICE MIDWIFE

## 2019-06-04 RX ORDER — DOCUSATE SODIUM 100 MG/1
100 CAPSULE, LIQUID FILLED ORAL
COMMUNITY
End: 2019-08-05 | Stop reason: SDUPTHER

## 2019-06-04 RX ORDER — POLYETHYLENE GLYCOL 3350 17 G/17G
17 POWDER, FOR SOLUTION ORAL DAILY PRN
Qty: 1530 G | Refills: 1 | Status: SHIPPED | OUTPATIENT
Start: 2019-06-04 | End: 2019-07-04

## 2019-07-03 ENCOUNTER — HOSPITAL ENCOUNTER (OUTPATIENT)
Dept: ULTRASOUND IMAGING | Age: 29
Discharge: HOME | End: 2019-07-05
Payer: MEDICAID

## 2019-07-03 ENCOUNTER — ROUTINE PRENATAL (OUTPATIENT)
Dept: OBGYN | Age: 29
End: 2019-07-03
Payer: MEDICAID

## 2019-07-03 VITALS
HEART RATE: 72 BPM | DIASTOLIC BLOOD PRESSURE: 72 MMHG | WEIGHT: 150 LBS | BODY MASS INDEX: 27.88 KG/M2 | SYSTOLIC BLOOD PRESSURE: 108 MMHG

## 2019-07-03 DIAGNOSIS — Z34.92 PRENATAL CARE, SECOND TRIMESTER: ICD-10-CM

## 2019-07-03 DIAGNOSIS — Z3A.20 20 WEEKS GESTATION OF PREGNANCY: ICD-10-CM

## 2019-07-03 DIAGNOSIS — K59.01 CONSTIPATION BY DELAYED COLONIC TRANSIT: ICD-10-CM

## 2019-07-03 DIAGNOSIS — Z34.92 PRENATAL CARE, SECOND TRIMESTER: Primary | ICD-10-CM

## 2019-07-03 PROCEDURE — 1036F TOBACCO NON-USER: CPT | Performed by: ADVANCED PRACTICE MIDWIFE

## 2019-07-03 PROCEDURE — 99213 OFFICE O/P EST LOW 20 MIN: CPT | Performed by: ADVANCED PRACTICE MIDWIFE

## 2019-07-03 PROCEDURE — 76805 OB US >/= 14 WKS SNGL FETUS: CPT

## 2019-07-03 PROCEDURE — G8419 CALC BMI OUT NRM PARAM NOF/U: HCPCS | Performed by: ADVANCED PRACTICE MIDWIFE

## 2019-07-03 PROCEDURE — G8427 DOCREV CUR MEDS BY ELIG CLIN: HCPCS | Performed by: ADVANCED PRACTICE MIDWIFE

## 2019-07-03 NOTE — PROGRESS NOTES
S:  Presents for prenatal visit today. Reports constipation improved with smoothies. Baby movements perceived. O:  MVSS, Afebrile. Abdomen gravid, cephalic and nontender. US = 20 Weeks 5 Days = EDC 11/15/2019 CWD, .18 g. +/- 59.13 g. (14 oz. +/- 2 oz.), 84.4%, CHELO 15.40, . A:  35 yo  at 20 Weeks 2 Days sIFANTA REYEZ S=D, +FHT's  P:  Education: discussed diet, continue with fruit and veggie smoothies, hydration, male fetus desires circ. RTO 4 weeks.

## 2019-07-31 ENCOUNTER — ROUTINE PRENATAL (OUTPATIENT)
Dept: OBGYN | Age: 29
End: 2019-07-31
Payer: MEDICAID

## 2019-07-31 VITALS
DIASTOLIC BLOOD PRESSURE: 78 MMHG | WEIGHT: 151 LBS | BODY MASS INDEX: 28.07 KG/M2 | HEART RATE: 78 BPM | SYSTOLIC BLOOD PRESSURE: 114 MMHG

## 2019-07-31 DIAGNOSIS — Z3A.24 24 WEEKS GESTATION OF PREGNANCY: ICD-10-CM

## 2019-07-31 DIAGNOSIS — Z34.92 PRENATAL CARE, SECOND TRIMESTER: Primary | ICD-10-CM

## 2019-07-31 PROCEDURE — G8419 CALC BMI OUT NRM PARAM NOF/U: HCPCS | Performed by: ADVANCED PRACTICE MIDWIFE

## 2019-07-31 PROCEDURE — G8427 DOCREV CUR MEDS BY ELIG CLIN: HCPCS | Performed by: ADVANCED PRACTICE MIDWIFE

## 2019-07-31 PROCEDURE — 99213 OFFICE O/P EST LOW 20 MIN: CPT | Performed by: ADVANCED PRACTICE MIDWIFE

## 2019-07-31 PROCEDURE — 1036F TOBACCO NON-USER: CPT | Performed by: ADVANCED PRACTICE MIDWIFE

## 2019-08-05 ENCOUNTER — ROUTINE PRENATAL (OUTPATIENT)
Dept: OBGYN | Age: 29
End: 2019-08-05
Payer: MEDICAID

## 2019-08-05 VITALS
HEART RATE: 72 BPM | WEIGHT: 151 LBS | DIASTOLIC BLOOD PRESSURE: 80 MMHG | HEIGHT: 62 IN | SYSTOLIC BLOOD PRESSURE: 116 MMHG | BODY MASS INDEX: 27.79 KG/M2

## 2019-08-05 DIAGNOSIS — K59.01 CONSTIPATION BY DELAYED COLONIC TRANSIT: ICD-10-CM

## 2019-08-05 DIAGNOSIS — Z3A.25 25 WEEKS GESTATION OF PREGNANCY: ICD-10-CM

## 2019-08-05 DIAGNOSIS — Z34.92 PRENATAL CARE, SECOND TRIMESTER: Primary | ICD-10-CM

## 2019-08-05 PROCEDURE — 1036F TOBACCO NON-USER: CPT | Performed by: ADVANCED PRACTICE MIDWIFE

## 2019-08-05 PROCEDURE — G8427 DOCREV CUR MEDS BY ELIG CLIN: HCPCS | Performed by: ADVANCED PRACTICE MIDWIFE

## 2019-08-05 PROCEDURE — 99213 OFFICE O/P EST LOW 20 MIN: CPT | Performed by: ADVANCED PRACTICE MIDWIFE

## 2019-08-05 PROCEDURE — G8419 CALC BMI OUT NRM PARAM NOF/U: HCPCS | Performed by: ADVANCED PRACTICE MIDWIFE

## 2019-08-05 RX ORDER — DOCUSATE SODIUM 100 MG/1
100 CAPSULE, LIQUID FILLED ORAL 2 TIMES DAILY
Qty: 60 CAPSULE | Refills: 1 | Status: SHIPPED | OUTPATIENT
Start: 2019-08-05 | End: 2019-10-04

## 2019-08-05 RX ORDER — AMPICILLIN TRIHYDRATE 500 MG
1 CAPSULE ORAL
COMMUNITY
End: 2019-08-06

## 2019-08-06 ENCOUNTER — OFFICE VISIT (OUTPATIENT)
Dept: FAMILY MEDICINE CLINIC | Age: 29
End: 2019-08-06
Payer: MEDICAID

## 2019-08-06 VITALS
BODY MASS INDEX: 28.08 KG/M2 | HEART RATE: 98 BPM | HEIGHT: 62 IN | OXYGEN SATURATION: 98 % | SYSTOLIC BLOOD PRESSURE: 100 MMHG | DIASTOLIC BLOOD PRESSURE: 60 MMHG | WEIGHT: 152.6 LBS

## 2019-08-06 DIAGNOSIS — S39.011D STRAIN OF ABDOMINAL MUSCLE, SUBSEQUENT ENCOUNTER: Primary | ICD-10-CM

## 2019-08-06 DIAGNOSIS — M62.838 TRAPEZIUS MUSCLE SPASM: ICD-10-CM

## 2019-08-06 PROCEDURE — 99213 OFFICE O/P EST LOW 20 MIN: CPT | Performed by: FAMILY MEDICINE

## 2019-08-06 PROCEDURE — G8419 CALC BMI OUT NRM PARAM NOF/U: HCPCS | Performed by: FAMILY MEDICINE

## 2019-08-06 PROCEDURE — G8427 DOCREV CUR MEDS BY ELIG CLIN: HCPCS | Performed by: FAMILY MEDICINE

## 2019-08-06 PROCEDURE — 1036F TOBACCO NON-USER: CPT | Performed by: FAMILY MEDICINE

## 2019-08-06 RX ORDER — CYCLOBENZAPRINE HCL 5 MG
5 TABLET ORAL NIGHTLY PRN
Qty: 30 TABLET | Refills: 0 | Status: SHIPPED | OUTPATIENT
Start: 2019-08-06 | End: 2019-08-16

## 2019-08-06 ASSESSMENT — ENCOUNTER SYMPTOMS
BACK PAIN: 1
CHOKING: 0
CONSTIPATION: 0
DIARRHEA: 0
ABDOMINAL PAIN: 1
SHORTNESS OF BREATH: 1
CHEST TIGHTNESS: 0
WHEEZING: 0

## 2019-08-06 NOTE — PATIENT INSTRUCTIONS
Patient Education        Rhomboid Muscle Strain: Rehab Exercises  Your Care Instructions  Here are some examples of typical rehabilitation exercises for your condition. Start each exercise slowly. Ease off the exercise if you start to have pain. Your doctor or physical therapist will tell you when you can start these exercises and which ones will work best for you. How to do the exercises  Lower neck and upper back (rhomboid) stretch    1. Stretch your arms out in front of your body. Clasp one hand on top of your other hand. 2. Gently reach out so that you feel your shoulder blades stretching away from each other. 3. Gently bend your head forward. 4. Hold for 15 to 30 seconds. 5. Repeat 2 to 4 times. Resisted rows    1. Put the band around a solid object, such as a bedpost, at about waist level. Stand facing where you have placed the band. Hold equal lengths of the band in each hand. 2. Start with your arms held out in front of you. 3. Pull the bands back, and move your shoulder blades together. As you finish, your elbows should be at your side and bent at 90 degrees (like the angle of the letter \"L\"). 4. Return to the starting position. 5. Repeat 8 to 12 times. Neck stretches    1. Look straight ahead, and tip your right ear to your right shoulder. Do not let your left shoulder rise as you tip your head to the right. 2. Hold for 15 to 30 seconds. 3. Tilt your head to the left. Do not let your right shoulder rise as you tip your head to the left. 4. Hold for 15 to 30 seconds. 5. Repeat 2 to 4 times to each side. Neck rotation    1. Sit in a firm chair, or stand up straight. 2. Keeping your chin level, turn your head to the right, and hold for 15 to 30 seconds. 3. Turn your head to the left, and hold for 15 to 30 seconds. 4. Repeat 2 to 4 times to each side. Follow-up care is a key part of your treatment and safety.  Be sure to make and go to all appointments, and call your doctor if you

## 2019-08-06 NOTE — PROGRESS NOTES
[address]  [place] [address]  Dept: [phone]  Dept Fax: [phone]  Loc: [phone]    Maureen Banks is a 34 y.o. female who presents today for her medical conditions/complaints as noted below. Maureen Banks is c/o of   Chief Complaint   Patient presents with    Flank Pain     right side- hurts when she breaths- started Friday night-sharp knife pain, constant pain, sitting, laying or standing-   she does carry her 9month daughter        HPI:      Here today for flank pain    Flank Pain   This is a new problem. The current episode started in the past 7 days (4 days ago very suddenly). The problem occurs constantly. The problem is unchanged. Pain location: RUQ. The quality of the pain is described as stabbing. Radiates to: her back and her right chest. The pain is at a severity of 8/10. The pain is moderate. Exacerbated by: deep breathing, moving makes it worse. Associated symptoms include abdominal pain. Pertinent negatives include no chest pain, dysuria, fever, headaches, numbness, paresis, paresthesias, tingling, weakness or weight loss. (She is struggling to sleep, diarrhea, food does not affect the pain, no falls) She has tried muscle relaxant and analgesics (pepcid, had a normal gallbladder ultrasound) for the symptoms. The treatment provided mild relief.        Past Medical History:   Diagnosis Date    Abnormal Pap smear of cervix     colpo 2017    Anxiety     Depression     resolve 2017          Social History     Tobacco Use    Smoking status: Former Smoker     Years: 15.00     Types: Cigarettes    Smokeless tobacco: Never Used   Substance Use Topics    Alcohol use: Not Currently     Current Outpatient Medications   Medication Sig Dispense Refill    cyclobenzaprine (FLEXERIL) 5 MG tablet Take 1 tablet by mouth nightly as needed for Muscle spasms 30 tablet 0    docusate sodium (COLACE) 100 MG capsule Take 1 capsule by mouth 2

## 2019-08-20 ENCOUNTER — ROUTINE PRENATAL (OUTPATIENT)
Dept: OBGYN | Age: 29
End: 2019-08-20
Payer: MEDICAID

## 2019-08-20 ENCOUNTER — HOSPITAL ENCOUNTER (OUTPATIENT)
Dept: LAB | Age: 29
Discharge: HOME | End: 2019-08-20
Payer: MEDICAID

## 2019-08-20 VITALS
SYSTOLIC BLOOD PRESSURE: 118 MMHG | HEART RATE: 70 BPM | BODY MASS INDEX: 28.44 KG/M2 | DIASTOLIC BLOOD PRESSURE: 68 MMHG | WEIGHT: 153 LBS

## 2019-08-20 DIAGNOSIS — Z34.92 PRENATAL CARE, SECOND TRIMESTER: ICD-10-CM

## 2019-08-20 DIAGNOSIS — R73.09 ABNORMAL GLUCOSE TOLERANCE TEST: Primary | ICD-10-CM

## 2019-08-20 DIAGNOSIS — Z3A.27 27 WEEKS GESTATION OF PREGNANCY: Primary | ICD-10-CM

## 2019-08-20 LAB
ABSOLUTE EOS #: 0.1 K/UL (ref 0–0.4)
ABSOLUTE IMMATURE GRANULOCYTE: ABNORMAL K/UL (ref 0–0.3)
ABSOLUTE LYMPH #: 2 K/UL (ref 1–4.8)
ABSOLUTE MONO #: 0.7 K/UL (ref 0.1–1.2)
BASOPHILS # BLD: 0 % (ref 0–1)
BASOPHILS ABSOLUTE: 0 K/UL (ref 0–0.2)
DIFFERENTIAL TYPE: ABNORMAL
EOSINOPHILS RELATIVE PERCENT: 0 % (ref 1–7)
GLUCOSE ADMINISTRATION: NORMAL
GLUCOSE TOLERANCE SCREEN 50G: 134 MG/DL (ref 70–135)
HCT VFR BLD CALC: 36.8 % (ref 36–46)
HEMOGLOBIN: 12.3 G/DL (ref 12–16)
IMMATURE GRANULOCYTES: ABNORMAL %
LYMPHOCYTES # BLD: 16 % (ref 16–46)
MCH RBC QN AUTO: 30 PG (ref 26–34)
MCHC RBC AUTO-ENTMCNC: 33.6 G/DL (ref 31–37)
MCV RBC AUTO: 89.4 FL (ref 80–100)
MONOCYTES # BLD: 5 % (ref 4–11)
NRBC AUTOMATED: ABNORMAL PER 100 WBC
PDW BLD-RTO: 13.5 % (ref 11–14.5)
PLATELET # BLD: 292 K/UL (ref 140–450)
PLATELET ESTIMATE: ABNORMAL
PMV BLD AUTO: 8.6 FL (ref 6–12)
RBC # BLD: 4.11 M/UL (ref 4–5.2)
RBC # BLD: ABNORMAL 10*6/UL
SEG NEUTROPHILS: 79 % (ref 43–77)
SEGMENTED NEUTROPHILS ABSOLUTE COUNT: 10.2 K/UL (ref 1.8–7.7)
WBC # BLD: 13 K/UL (ref 3.5–11)
WBC # BLD: ABNORMAL 10*3/UL

## 2019-08-20 PROCEDURE — 99213 OFFICE O/P EST LOW 20 MIN: CPT | Performed by: ADVANCED PRACTICE MIDWIFE

## 2019-08-20 PROCEDURE — 90715 TDAP VACCINE 7 YRS/> IM: CPT | Performed by: ADVANCED PRACTICE MIDWIFE

## 2019-08-20 PROCEDURE — 85025 COMPLETE CBC W/AUTO DIFF WBC: CPT

## 2019-08-20 PROCEDURE — 82950 GLUCOSE TEST: CPT

## 2019-08-20 PROCEDURE — G8427 DOCREV CUR MEDS BY ELIG CLIN: HCPCS | Performed by: ADVANCED PRACTICE MIDWIFE

## 2019-08-20 PROCEDURE — 90471 IMMUNIZATION ADMIN: CPT | Performed by: ADVANCED PRACTICE MIDWIFE

## 2019-08-20 PROCEDURE — 36415 COLL VENOUS BLD VENIPUNCTURE: CPT

## 2019-08-20 PROCEDURE — G8419 CALC BMI OUT NRM PARAM NOF/U: HCPCS | Performed by: ADVANCED PRACTICE MIDWIFE

## 2019-08-20 PROCEDURE — 1036F TOBACCO NON-USER: CPT | Performed by: ADVANCED PRACTICE MIDWIFE

## 2019-08-20 RX ORDER — POLYETHYLENE GLYCOL 3350 17 G/17G
POWDER, FOR SOLUTION ORAL
COMMUNITY
Start: 2019-08-05 | End: 2020-02-03 | Stop reason: ALTCHOICE

## 2019-08-30 ENCOUNTER — HOSPITAL ENCOUNTER (OUTPATIENT)
Dept: LAB | Age: 29
Discharge: HOME | End: 2019-08-30
Payer: MEDICAID

## 2019-08-30 DIAGNOSIS — R73.09 ABNORMAL GLUCOSE TOLERANCE TEST: ICD-10-CM

## 2019-08-30 LAB
AMOUNT GLUCOSE GIVEN: 100 G
GLUCOSE FASTING: 92 MG/DL (ref 65–99)
GLUCOSE TOLERANCE TEST 1 HOUR: 177 MG/DL (ref 65–184)
GLUCOSE TOLERANCE TEST 2 HOUR: 159 MG/DL (ref 65–139)
GLUCOSE TOLERANCE TEST 3 HOUR: 140 MG/DL (ref 65–130)

## 2019-08-30 PROCEDURE — 82951 GLUCOSE TOLERANCE TEST (GTT): CPT

## 2019-08-30 PROCEDURE — 36415 COLL VENOUS BLD VENIPUNCTURE: CPT

## 2019-08-30 PROCEDURE — 82952 GTT-ADDED SAMPLES: CPT

## 2019-09-10 ENCOUNTER — ROUTINE PRENATAL (OUTPATIENT)
Dept: OBGYN | Age: 29
End: 2019-09-10
Payer: MEDICAID

## 2019-09-10 VITALS
HEART RATE: 74 BPM | SYSTOLIC BLOOD PRESSURE: 112 MMHG | BODY MASS INDEX: 28.63 KG/M2 | WEIGHT: 154 LBS | DIASTOLIC BLOOD PRESSURE: 72 MMHG

## 2019-09-10 DIAGNOSIS — Z3A.30 30 WEEKS GESTATION OF PREGNANCY: ICD-10-CM

## 2019-09-10 DIAGNOSIS — Z34.93 PRENATAL CARE, THIRD TRIMESTER: Primary | ICD-10-CM

## 2019-09-10 PROCEDURE — 99213 OFFICE O/P EST LOW 20 MIN: CPT | Performed by: ADVANCED PRACTICE MIDWIFE

## 2019-09-10 PROCEDURE — 1036F TOBACCO NON-USER: CPT | Performed by: ADVANCED PRACTICE MIDWIFE

## 2019-09-10 PROCEDURE — G8427 DOCREV CUR MEDS BY ELIG CLIN: HCPCS | Performed by: ADVANCED PRACTICE MIDWIFE

## 2019-09-10 PROCEDURE — G8419 CALC BMI OUT NRM PARAM NOF/U: HCPCS | Performed by: ADVANCED PRACTICE MIDWIFE

## 2019-09-17 ENCOUNTER — HOSPITAL ENCOUNTER (OUTPATIENT)
Dept: LAB | Age: 29
Discharge: HOME | End: 2019-09-17
Payer: MEDICAID

## 2019-09-17 ENCOUNTER — ROUTINE PRENATAL (OUTPATIENT)
Dept: OBGYN | Age: 29
End: 2019-09-17
Payer: MEDICAID

## 2019-09-17 VITALS
DIASTOLIC BLOOD PRESSURE: 72 MMHG | SYSTOLIC BLOOD PRESSURE: 100 MMHG | HEART RATE: 76 BPM | WEIGHT: 154 LBS | BODY MASS INDEX: 28.63 KG/M2

## 2019-09-17 DIAGNOSIS — Z3A.31 31 WEEKS GESTATION OF PREGNANCY: ICD-10-CM

## 2019-09-17 DIAGNOSIS — Z34.93 PRENATAL CARE, THIRD TRIMESTER: Primary | ICD-10-CM

## 2019-09-17 DIAGNOSIS — Z34.93 PRENATAL CARE, THIRD TRIMESTER: ICD-10-CM

## 2019-09-17 LAB
ABSOLUTE EOS #: 0.1 K/UL (ref 0–0.4)
ABSOLUTE IMMATURE GRANULOCYTE: ABNORMAL K/UL (ref 0–0.3)
ABSOLUTE LYMPH #: 2.2 K/UL (ref 1–4.8)
ABSOLUTE MONO #: 0.9 K/UL (ref 0.1–1.2)
ALBUMIN SERPL-MCNC: 3.7 G/DL (ref 3.5–5.2)
ALBUMIN/GLOBULIN RATIO: 1.2 (ref 1–2.5)
ALP BLD-CCNC: 113 U/L (ref 35–104)
ALT SERPL-CCNC: 9 U/L (ref 5–33)
ANION GAP SERPL CALCULATED.3IONS-SCNC: 11 MMOL/L (ref 9–17)
AST SERPL-CCNC: 14 U/L
BASOPHILS # BLD: 0 % (ref 0–1)
BASOPHILS ABSOLUTE: 0 K/UL (ref 0–0.2)
BILIRUB SERPL-MCNC: 0.15 MG/DL (ref 0.3–1.2)
BUN BLDV-MCNC: 8 MG/DL (ref 6–20)
BUN/CREAT BLD: 17 (ref 9–20)
CALCIUM SERPL-MCNC: 9.5 MG/DL (ref 8.6–10.4)
CHLORIDE BLD-SCNC: 101 MMOL/L (ref 98–107)
CO2: 24 MMOL/L (ref 20–31)
CREAT SERPL-MCNC: 0.46 MG/DL (ref 0.5–0.9)
DIFFERENTIAL TYPE: ABNORMAL
EOSINOPHILS RELATIVE PERCENT: 0 % (ref 1–7)
GFR AFRICAN AMERICAN: >60 ML/MIN
GFR NON-AFRICAN AMERICAN: >60 ML/MIN
GFR SERPL CREATININE-BSD FRML MDRD: ABNORMAL ML/MIN/{1.73_M2}
GFR SERPL CREATININE-BSD FRML MDRD: ABNORMAL ML/MIN/{1.73_M2}
GLUCOSE BLD-MCNC: 89 MG/DL (ref 70–99)
HCT VFR BLD CALC: 36 % (ref 36–46)
HEMOGLOBIN: 12.1 G/DL (ref 12–16)
IMMATURE GRANULOCYTES: ABNORMAL %
LYMPHOCYTES # BLD: 17 % (ref 16–46)
MCH RBC QN AUTO: 30.1 PG (ref 26–34)
MCHC RBC AUTO-ENTMCNC: 33.5 G/DL (ref 31–37)
MCV RBC AUTO: 89.8 FL (ref 80–100)
MONOCYTES # BLD: 7 % (ref 4–11)
NRBC AUTOMATED: ABNORMAL PER 100 WBC
PDW BLD-RTO: 13.9 % (ref 11–14.5)
PLATELET # BLD: 270 K/UL (ref 140–450)
PLATELET ESTIMATE: ABNORMAL
PMV BLD AUTO: 8.9 FL (ref 6–12)
POTASSIUM SERPL-SCNC: 4 MMOL/L (ref 3.7–5.3)
RBC # BLD: 4.01 M/UL (ref 4–5.2)
RBC # BLD: ABNORMAL 10*6/UL
SEG NEUTROPHILS: 76 % (ref 43–77)
SEGMENTED NEUTROPHILS ABSOLUTE COUNT: 9.8 K/UL (ref 1.8–7.7)
SODIUM BLD-SCNC: 136 MMOL/L (ref 135–144)
TOTAL PROTEIN: 6.9 G/DL (ref 6.4–8.3)
WBC # BLD: 12.9 K/UL (ref 3.5–11)
WBC # BLD: ABNORMAL 10*3/UL

## 2019-09-17 PROCEDURE — 36415 COLL VENOUS BLD VENIPUNCTURE: CPT

## 2019-09-17 PROCEDURE — G8419 CALC BMI OUT NRM PARAM NOF/U: HCPCS | Performed by: ADVANCED PRACTICE MIDWIFE

## 2019-09-17 PROCEDURE — 99213 OFFICE O/P EST LOW 20 MIN: CPT | Performed by: ADVANCED PRACTICE MIDWIFE

## 2019-09-17 PROCEDURE — 1036F TOBACCO NON-USER: CPT | Performed by: ADVANCED PRACTICE MIDWIFE

## 2019-09-17 PROCEDURE — G8427 DOCREV CUR MEDS BY ELIG CLIN: HCPCS | Performed by: ADVANCED PRACTICE MIDWIFE

## 2019-09-17 PROCEDURE — 80053 COMPREHEN METABOLIC PANEL: CPT

## 2019-09-17 PROCEDURE — 85025 COMPLETE CBC W/AUTO DIFF WBC: CPT

## 2019-09-23 ENCOUNTER — ROUTINE PRENATAL (OUTPATIENT)
Dept: OBGYN | Age: 29
End: 2019-09-23
Payer: MEDICAID

## 2019-09-23 ENCOUNTER — HOSPITAL ENCOUNTER (OUTPATIENT)
Dept: ULTRASOUND IMAGING | Age: 29
Discharge: HOME | End: 2019-09-25
Payer: MEDICAID

## 2019-09-23 VITALS
HEART RATE: 70 BPM | WEIGHT: 156 LBS | DIASTOLIC BLOOD PRESSURE: 64 MMHG | BODY MASS INDEX: 29 KG/M2 | SYSTOLIC BLOOD PRESSURE: 106 MMHG

## 2019-09-23 DIAGNOSIS — Z34.93 PRENATAL CARE, THIRD TRIMESTER: Primary | ICD-10-CM

## 2019-09-23 DIAGNOSIS — Z34.93 PRENATAL CARE, THIRD TRIMESTER: ICD-10-CM

## 2019-09-23 DIAGNOSIS — Z3A.32 32 WEEKS GESTATION OF PREGNANCY: ICD-10-CM

## 2019-09-23 DIAGNOSIS — K21.9 GASTROESOPHAGEAL REFLUX DISEASE WITHOUT ESOPHAGITIS: ICD-10-CM

## 2019-09-23 PROCEDURE — 76805 OB US >/= 14 WKS SNGL FETUS: CPT

## 2019-09-23 PROCEDURE — G8419 CALC BMI OUT NRM PARAM NOF/U: HCPCS | Performed by: ADVANCED PRACTICE MIDWIFE

## 2019-09-23 PROCEDURE — 1036F TOBACCO NON-USER: CPT | Performed by: ADVANCED PRACTICE MIDWIFE

## 2019-09-23 PROCEDURE — G8427 DOCREV CUR MEDS BY ELIG CLIN: HCPCS | Performed by: ADVANCED PRACTICE MIDWIFE

## 2019-09-23 PROCEDURE — 99213 OFFICE O/P EST LOW 20 MIN: CPT | Performed by: ADVANCED PRACTICE MIDWIFE

## 2019-09-23 RX ORDER — OMEPRAZOLE 20 MG/1
20 CAPSULE, DELAYED RELEASE ORAL 2 TIMES DAILY
Qty: 60 CAPSULE | Refills: 3 | Status: SHIPPED | OUTPATIENT
Start: 2019-09-23 | End: 2020-02-03 | Stop reason: ALTCHOICE

## 2019-09-23 NOTE — PROGRESS NOTES
S:  Presents for prenatal visit and growth US. Reports heartburn. Baby is active, no contractions. O:  MVSS, afebrile. US= 32 weeks 5 days = EDC 2019, EFW 2065 g. +/- 309.78 g. , (4# 9 oz. +/- 11 oz.), 67.5%, CHELO 12.57 cm. . A:  35 yo  at 32 Weeks 0 Days SIUP, Growth CWD, +FHT's  P:  Education: Rev'd US, ds'd remedies, prescription sent into pharmacy, RTO 2 weeks.

## 2019-10-08 ENCOUNTER — ROUTINE PRENATAL (OUTPATIENT)
Dept: OBGYN | Age: 29
End: 2019-10-08
Payer: MEDICAID

## 2019-10-08 VITALS
SYSTOLIC BLOOD PRESSURE: 108 MMHG | BODY MASS INDEX: 28.81 KG/M2 | WEIGHT: 155 LBS | DIASTOLIC BLOOD PRESSURE: 80 MMHG | HEART RATE: 70 BPM

## 2019-10-08 DIAGNOSIS — Z3A.34 34 WEEKS GESTATION OF PREGNANCY: ICD-10-CM

## 2019-10-08 DIAGNOSIS — Z34.93 PRENATAL CARE, THIRD TRIMESTER: Primary | ICD-10-CM

## 2019-10-08 PROCEDURE — G8419 CALC BMI OUT NRM PARAM NOF/U: HCPCS | Performed by: ADVANCED PRACTICE MIDWIFE

## 2019-10-08 PROCEDURE — G8427 DOCREV CUR MEDS BY ELIG CLIN: HCPCS | Performed by: ADVANCED PRACTICE MIDWIFE

## 2019-10-08 PROCEDURE — 99213 OFFICE O/P EST LOW 20 MIN: CPT | Performed by: ADVANCED PRACTICE MIDWIFE

## 2019-10-08 PROCEDURE — 1036F TOBACCO NON-USER: CPT | Performed by: ADVANCED PRACTICE MIDWIFE

## 2019-10-08 PROCEDURE — G8484 FLU IMMUNIZE NO ADMIN: HCPCS | Performed by: ADVANCED PRACTICE MIDWIFE

## 2019-10-22 ENCOUNTER — HOSPITAL ENCOUNTER (OUTPATIENT)
Age: 29
Setting detail: SPECIMEN
Discharge: HOME | End: 2019-10-22
Payer: MEDICAID

## 2019-10-22 ENCOUNTER — ROUTINE PRENATAL (OUTPATIENT)
Dept: OBGYN | Age: 29
End: 2019-10-22
Payer: MEDICAID

## 2019-10-22 VITALS
SYSTOLIC BLOOD PRESSURE: 110 MMHG | WEIGHT: 158 LBS | DIASTOLIC BLOOD PRESSURE: 68 MMHG | HEART RATE: 74 BPM | BODY MASS INDEX: 29.37 KG/M2

## 2019-10-22 DIAGNOSIS — Z34.93 PRENATAL CARE, THIRD TRIMESTER: ICD-10-CM

## 2019-10-22 DIAGNOSIS — Z3A.36 36 WEEKS GESTATION OF PREGNANCY: ICD-10-CM

## 2019-10-22 DIAGNOSIS — Z23 NEED FOR INFLUENZA VACCINATION: Primary | ICD-10-CM

## 2019-10-22 PROCEDURE — G8427 DOCREV CUR MEDS BY ELIG CLIN: HCPCS | Performed by: ADVANCED PRACTICE MIDWIFE

## 2019-10-22 PROCEDURE — 87081 CULTURE SCREEN ONLY: CPT

## 2019-10-22 PROCEDURE — 90471 IMMUNIZATION ADMIN: CPT | Performed by: ADVANCED PRACTICE MIDWIFE

## 2019-10-22 PROCEDURE — 86403 PARTICLE AGGLUT ANTBDY SCRN: CPT

## 2019-10-22 PROCEDURE — 1036F TOBACCO NON-USER: CPT | Performed by: ADVANCED PRACTICE MIDWIFE

## 2019-10-22 PROCEDURE — G8482 FLU IMMUNIZE ORDER/ADMIN: HCPCS | Performed by: ADVANCED PRACTICE MIDWIFE

## 2019-10-22 PROCEDURE — 90686 IIV4 VACC NO PRSV 0.5 ML IM: CPT | Performed by: ADVANCED PRACTICE MIDWIFE

## 2019-10-22 PROCEDURE — 99213 OFFICE O/P EST LOW 20 MIN: CPT | Performed by: ADVANCED PRACTICE MIDWIFE

## 2019-10-22 PROCEDURE — G8419 CALC BMI OUT NRM PARAM NOF/U: HCPCS | Performed by: ADVANCED PRACTICE MIDWIFE

## 2019-10-24 LAB
CULTURE: ABNORMAL
Lab: ABNORMAL
SPECIMEN DESCRIPTION: ABNORMAL

## 2019-10-31 ENCOUNTER — ROUTINE PRENATAL (OUTPATIENT)
Dept: OBGYN | Age: 29
End: 2019-10-31
Payer: MEDICAID

## 2019-10-31 VITALS
DIASTOLIC BLOOD PRESSURE: 68 MMHG | BODY MASS INDEX: 29.37 KG/M2 | HEART RATE: 78 BPM | SYSTOLIC BLOOD PRESSURE: 112 MMHG | WEIGHT: 158 LBS

## 2019-10-31 DIAGNOSIS — Z34.93 PRENATAL CARE, THIRD TRIMESTER: Primary | ICD-10-CM

## 2019-10-31 DIAGNOSIS — Z3A.37 37 WEEKS GESTATION OF PREGNANCY: ICD-10-CM

## 2019-10-31 PROCEDURE — G8419 CALC BMI OUT NRM PARAM NOF/U: HCPCS | Performed by: ADVANCED PRACTICE MIDWIFE

## 2019-10-31 PROCEDURE — 99213 OFFICE O/P EST LOW 20 MIN: CPT | Performed by: ADVANCED PRACTICE MIDWIFE

## 2019-10-31 PROCEDURE — 1036F TOBACCO NON-USER: CPT | Performed by: ADVANCED PRACTICE MIDWIFE

## 2019-10-31 PROCEDURE — G8427 DOCREV CUR MEDS BY ELIG CLIN: HCPCS | Performed by: ADVANCED PRACTICE MIDWIFE

## 2019-10-31 PROCEDURE — G8482 FLU IMMUNIZE ORDER/ADMIN: HCPCS | Performed by: ADVANCED PRACTICE MIDWIFE

## 2019-10-31 RX ORDER — DOCUSATE SODIUM 100 MG/1
CAPSULE, LIQUID FILLED ORAL
COMMUNITY
Start: 2019-10-22 | End: 2020-02-03 | Stop reason: ALTCHOICE

## 2019-11-03 LAB
BILIRUBIN, URINE: NEGATIVE
BLOOD, URINE: NEGATIVE
CLARITY: CLEAR
COLOR: YELLOW
GLUCOSE URINE: NORMAL
KETONES, URINE: NEGATIVE
LEUKOCYTE ESTERASE, URINE: NEGATIVE
NITRITE, URINE: NEGATIVE
PH UA: 6 (ref 4.5–8)
PROTEIN UA: NEGATIVE
SPECIFIC GRAVITY, URINE: 1.02
UROBILINOGEN, URINE: NORMAL

## 2019-11-04 ENCOUNTER — ROUTINE PRENATAL (OUTPATIENT)
Dept: OBGYN | Age: 29
End: 2019-11-04
Payer: MEDICAID

## 2019-11-04 VITALS
BODY MASS INDEX: 29.37 KG/M2 | WEIGHT: 158 LBS | SYSTOLIC BLOOD PRESSURE: 120 MMHG | DIASTOLIC BLOOD PRESSURE: 82 MMHG | HEART RATE: 82 BPM

## 2019-11-04 DIAGNOSIS — Z34.93 PRENATAL CARE, THIRD TRIMESTER: Primary | ICD-10-CM

## 2019-11-04 DIAGNOSIS — Z3A.38 38 WEEKS GESTATION OF PREGNANCY: ICD-10-CM

## 2019-11-04 PROCEDURE — 1036F TOBACCO NON-USER: CPT | Performed by: ADVANCED PRACTICE MIDWIFE

## 2019-11-04 PROCEDURE — G8427 DOCREV CUR MEDS BY ELIG CLIN: HCPCS | Performed by: ADVANCED PRACTICE MIDWIFE

## 2019-11-04 PROCEDURE — 99213 OFFICE O/P EST LOW 20 MIN: CPT | Performed by: ADVANCED PRACTICE MIDWIFE

## 2019-11-04 PROCEDURE — G8419 CALC BMI OUT NRM PARAM NOF/U: HCPCS | Performed by: ADVANCED PRACTICE MIDWIFE

## 2019-11-04 PROCEDURE — G8482 FLU IMMUNIZE ORDER/ADMIN: HCPCS | Performed by: ADVANCED PRACTICE MIDWIFE

## 2019-11-05 ENCOUNTER — ROUTINE PRENATAL (OUTPATIENT)
Dept: OBGYN | Age: 29
End: 2019-11-05
Payer: MEDICAID

## 2019-11-05 ENCOUNTER — TELEPHONE (OUTPATIENT)
Dept: OBGYN | Age: 29
End: 2019-11-05

## 2019-11-05 VITALS
DIASTOLIC BLOOD PRESSURE: 82 MMHG | HEART RATE: 80 BPM | SYSTOLIC BLOOD PRESSURE: 122 MMHG | BODY MASS INDEX: 29.56 KG/M2 | WEIGHT: 159 LBS

## 2019-11-05 DIAGNOSIS — Z3A.38 38 WEEKS GESTATION OF PREGNANCY: ICD-10-CM

## 2019-11-05 DIAGNOSIS — O36.8120 DECREASED FETAL MOVEMENTS IN SECOND TRIMESTER, SINGLE OR UNSPECIFIED FETUS: ICD-10-CM

## 2019-11-05 DIAGNOSIS — Z34.93 PRENATAL CARE, THIRD TRIMESTER: Primary | ICD-10-CM

## 2019-11-05 PROCEDURE — G8482 FLU IMMUNIZE ORDER/ADMIN: HCPCS | Performed by: ADVANCED PRACTICE MIDWIFE

## 2019-11-05 PROCEDURE — 99213 OFFICE O/P EST LOW 20 MIN: CPT | Performed by: ADVANCED PRACTICE MIDWIFE

## 2019-11-05 PROCEDURE — G8419 CALC BMI OUT NRM PARAM NOF/U: HCPCS | Performed by: ADVANCED PRACTICE MIDWIFE

## 2019-11-05 PROCEDURE — 59025 FETAL NON-STRESS TEST: CPT | Performed by: ADVANCED PRACTICE MIDWIFE

## 2019-11-05 PROCEDURE — G8427 DOCREV CUR MEDS BY ELIG CLIN: HCPCS | Performed by: ADVANCED PRACTICE MIDWIFE

## 2019-11-05 PROCEDURE — 1036F TOBACCO NON-USER: CPT | Performed by: ADVANCED PRACTICE MIDWIFE

## 2019-11-10 LAB
BASOPHILS ABSOLUTE: 0.11 /ΜL
BASOPHILS RELATIVE PERCENT: 0.86 %
EOSINOPHILS ABSOLUTE: 0.06 /ΜL
EOSINOPHILS RELATIVE PERCENT: 0.47 %
HCT VFR BLD CALC: 21.5 % (ref 36–46)
HCT VFR BLD CALC: 36.3 % (ref 36–46)
HEMOGLOBIN: 11.7 G/DL (ref 12–16)
HEMOGLOBIN: 7.1 G/DL (ref 12–16)
LYMPHOCYTES ABSOLUTE: 2.51 /ΜL
LYMPHOCYTES RELATIVE PERCENT: 19.65 %
MCH RBC QN AUTO: 28.9 PG
MCHC RBC AUTO-ENTMCNC: 32.3 G/DL
MCV RBC AUTO: 89.4 FL
MONOCYTES ABSOLUTE: 0.98 /ΜL
MONOCYTES RELATIVE PERCENT: 7.67 %
NEUTROPHILS ABSOLUTE: 9.12 /ΜL
NEUTROPHILS RELATIVE PERCENT: 71.34 %
PLATELET # BLD: 193.8 K/ΜL
PMV BLD AUTO: ABNORMAL FL
RBC # BLD: 4.06 10^6/ΜL
WBC # BLD: 12.8 10^3/ML

## 2019-11-18 ENCOUNTER — TELEPHONE (OUTPATIENT)
Dept: OBGYN | Age: 29
End: 2019-11-18

## 2019-11-18 ENCOUNTER — HOSPITAL ENCOUNTER (OUTPATIENT)
Age: 29
Setting detail: SPECIMEN
Discharge: HOME | End: 2019-11-18
Payer: MEDICAID

## 2019-11-18 ENCOUNTER — POSTPARTUM VISIT (OUTPATIENT)
Dept: OBGYN | Age: 29
End: 2019-11-18
Payer: MEDICAID

## 2019-11-18 VITALS
HEART RATE: 72 BPM | SYSTOLIC BLOOD PRESSURE: 116 MMHG | WEIGHT: 142 LBS | DIASTOLIC BLOOD PRESSURE: 70 MMHG | HEIGHT: 62 IN | BODY MASS INDEX: 26.13 KG/M2

## 2019-11-18 DIAGNOSIS — D50.8 OTHER IRON DEFICIENCY ANEMIA: ICD-10-CM

## 2019-11-18 DIAGNOSIS — Z87.59 HISTORY OF RETAINED PLACENTA: ICD-10-CM

## 2019-11-18 LAB
ABSOLUTE EOS #: 0.1 K/UL (ref 0–0.4)
ABSOLUTE IMMATURE GRANULOCYTE: ABNORMAL K/UL (ref 0–0.3)
ABSOLUTE LYMPH #: 2.8 K/UL (ref 1–4.8)
ABSOLUTE MONO #: 0.5 K/UL (ref 0.1–1.2)
BASOPHILS # BLD: 1 % (ref 0–1)
BASOPHILS ABSOLUTE: 0.1 K/UL (ref 0–0.2)
DIFFERENTIAL TYPE: ABNORMAL
EOSINOPHILS RELATIVE PERCENT: 1 % (ref 1–7)
HCT VFR BLD CALC: 27.4 % (ref 36–46)
HEMOGLOBIN: 8.9 G/DL (ref 12–16)
IMMATURE GRANULOCYTES: ABNORMAL %
LYMPHOCYTES # BLD: 32 % (ref 16–46)
MCH RBC QN AUTO: 29.6 PG (ref 26–34)
MCHC RBC AUTO-ENTMCNC: 32.4 G/DL (ref 31–37)
MCV RBC AUTO: 91.5 FL (ref 80–100)
MONOCYTES # BLD: 6 % (ref 4–11)
NRBC AUTOMATED: ABNORMAL PER 100 WBC
PDW BLD-RTO: 14.7 % (ref 11–14.5)
PLATELET # BLD: 448 K/UL (ref 140–450)
PLATELET ESTIMATE: ABNORMAL
PMV BLD AUTO: 7.5 FL (ref 6–12)
RBC # BLD: 3 M/UL (ref 4–5.2)
RBC # BLD: ABNORMAL 10*6/UL
SEG NEUTROPHILS: 60 % (ref 43–77)
SEGMENTED NEUTROPHILS ABSOLUTE COUNT: 5.3 K/UL (ref 1.8–7.7)
WBC # BLD: 8.8 K/UL (ref 3.5–11)
WBC # BLD: ABNORMAL 10*3/UL

## 2019-11-18 PROCEDURE — G8482 FLU IMMUNIZE ORDER/ADMIN: HCPCS | Performed by: ADVANCED PRACTICE MIDWIFE

## 2019-11-18 PROCEDURE — G8419 CALC BMI OUT NRM PARAM NOF/U: HCPCS | Performed by: ADVANCED PRACTICE MIDWIFE

## 2019-11-18 PROCEDURE — 85025 COMPLETE CBC W/AUTO DIFF WBC: CPT

## 2019-11-18 PROCEDURE — 1036F TOBACCO NON-USER: CPT | Performed by: ADVANCED PRACTICE MIDWIFE

## 2019-11-18 PROCEDURE — G8427 DOCREV CUR MEDS BY ELIG CLIN: HCPCS | Performed by: ADVANCED PRACTICE MIDWIFE

## 2019-11-18 PROCEDURE — 99214 OFFICE O/P EST MOD 30 MIN: CPT | Performed by: ADVANCED PRACTICE MIDWIFE

## 2019-11-18 PROCEDURE — 36415 COLL VENOUS BLD VENIPUNCTURE: CPT

## 2019-11-18 RX ORDER — CITALOPRAM 20 MG/1
20 TABLET ORAL DAILY
Qty: 30 TABLET | Refills: 3 | Status: SHIPPED | OUTPATIENT
Start: 2019-11-18 | End: 2020-02-03 | Stop reason: ALTCHOICE

## 2019-11-18 ASSESSMENT — ENCOUNTER SYMPTOMS
RESPIRATORY NEGATIVE: 1
GASTROINTESTINAL NEGATIVE: 1
EYES NEGATIVE: 1

## 2019-11-26 ENCOUNTER — TELEPHONE (OUTPATIENT)
Dept: OBGYN | Age: 29
End: 2019-11-26

## 2019-12-02 ENCOUNTER — TELEPHONE (OUTPATIENT)
Dept: OBGYN | Age: 29
End: 2019-12-02

## 2019-12-04 ENCOUNTER — OFFICE VISIT (OUTPATIENT)
Dept: OBGYN | Age: 29
End: 2019-12-04
Payer: MEDICAID

## 2019-12-04 VITALS
WEIGHT: 144 LBS | SYSTOLIC BLOOD PRESSURE: 112 MMHG | DIASTOLIC BLOOD PRESSURE: 66 MMHG | BODY MASS INDEX: 26.5 KG/M2 | HEIGHT: 62 IN | HEART RATE: 64 BPM

## 2019-12-04 PROCEDURE — G8419 CALC BMI OUT NRM PARAM NOF/U: HCPCS | Performed by: ADVANCED PRACTICE MIDWIFE

## 2019-12-04 PROCEDURE — 1036F TOBACCO NON-USER: CPT | Performed by: ADVANCED PRACTICE MIDWIFE

## 2019-12-04 PROCEDURE — G8482 FLU IMMUNIZE ORDER/ADMIN: HCPCS | Performed by: ADVANCED PRACTICE MIDWIFE

## 2019-12-04 PROCEDURE — 99213 OFFICE O/P EST LOW 20 MIN: CPT | Performed by: ADVANCED PRACTICE MIDWIFE

## 2019-12-04 PROCEDURE — G8427 DOCREV CUR MEDS BY ELIG CLIN: HCPCS | Performed by: ADVANCED PRACTICE MIDWIFE

## 2019-12-04 ASSESSMENT — ENCOUNTER SYMPTOMS
EYES NEGATIVE: 1
GASTROINTESTINAL NEGATIVE: 1
RESPIRATORY NEGATIVE: 1

## 2019-12-13 ENCOUNTER — HOSPITAL ENCOUNTER (OUTPATIENT)
Dept: LAB | Age: 29
Discharge: HOME | End: 2019-12-13
Payer: MEDICAID

## 2019-12-13 ENCOUNTER — TELEPHONE (OUTPATIENT)
Dept: OBGYN | Age: 29
End: 2019-12-13

## 2019-12-13 DIAGNOSIS — Z01.818 PRE-OP TESTING: Primary | ICD-10-CM

## 2019-12-13 DIAGNOSIS — Z01.818 PRE-OP TESTING: ICD-10-CM

## 2019-12-13 LAB
ABSOLUTE EOS #: 0 K/UL (ref 0–0.4)
ABSOLUTE IMMATURE GRANULOCYTE: ABNORMAL K/UL (ref 0–0.3)
ABSOLUTE LYMPH #: 2.5 K/UL (ref 1–4.8)
ABSOLUTE MONO #: 0.6 K/UL (ref 0.1–1.2)
BASOPHILS # BLD: 1 % (ref 0–1)
BASOPHILS ABSOLUTE: 0 K/UL (ref 0–0.2)
DIFFERENTIAL TYPE: ABNORMAL
EOSINOPHILS RELATIVE PERCENT: 1 % (ref 1–7)
HCG QUALITATIVE: NEGATIVE
HCT VFR BLD CALC: 33.2 % (ref 36–46)
HEMOGLOBIN: 10.8 G/DL (ref 12–16)
IMMATURE GRANULOCYTES: ABNORMAL %
LYMPHOCYTES # BLD: 31 % (ref 16–46)
MCH RBC QN AUTO: 28.4 PG (ref 26–34)
MCHC RBC AUTO-ENTMCNC: 32.6 G/DL (ref 31–37)
MCV RBC AUTO: 87.3 FL (ref 80–100)
MONOCYTES # BLD: 7 % (ref 4–11)
NRBC AUTOMATED: ABNORMAL PER 100 WBC
PDW BLD-RTO: 14.2 % (ref 11–14.5)
PLATELET # BLD: 334 K/UL (ref 140–450)
PLATELET ESTIMATE: ABNORMAL
PMV BLD AUTO: 7.6 FL (ref 6–12)
RBC # BLD: 3.81 M/UL (ref 4–5.2)
RBC # BLD: ABNORMAL 10*6/UL
SEG NEUTROPHILS: 60 % (ref 43–77)
SEGMENTED NEUTROPHILS ABSOLUTE COUNT: 5.1 K/UL (ref 1.8–7.7)
WBC # BLD: 8.2 K/UL (ref 3.5–11)
WBC # BLD: ABNORMAL 10*3/UL

## 2019-12-13 PROCEDURE — 84703 CHORIONIC GONADOTROPIN ASSAY: CPT

## 2019-12-13 PROCEDURE — 36415 COLL VENOUS BLD VENIPUNCTURE: CPT

## 2019-12-13 PROCEDURE — 85025 COMPLETE CBC W/AUTO DIFF WBC: CPT

## 2019-12-17 LAB — SURGICAL PATHOLOGY REPORT: NORMAL

## 2020-02-03 ENCOUNTER — OFFICE VISIT (OUTPATIENT)
Dept: PRIMARY CARE CLINIC | Age: 30
End: 2020-02-03
Payer: MEDICAID

## 2020-02-03 VITALS
TEMPERATURE: 99.2 F | HEIGHT: 61 IN | SYSTOLIC BLOOD PRESSURE: 110 MMHG | DIASTOLIC BLOOD PRESSURE: 64 MMHG | OXYGEN SATURATION: 97 % | WEIGHT: 137 LBS | HEART RATE: 92 BPM | BODY MASS INDEX: 25.86 KG/M2

## 2020-02-03 LAB
INFLUENZA A ANTIBODY: NEGATIVE
INFLUENZA B ANTIBODY: POSITIVE

## 2020-02-03 PROCEDURE — G8427 DOCREV CUR MEDS BY ELIG CLIN: HCPCS | Performed by: FAMILY MEDICINE

## 2020-02-03 PROCEDURE — 99212 OFFICE O/P EST SF 10 MIN: CPT

## 2020-02-03 PROCEDURE — 87804 INFLUENZA ASSAY W/OPTIC: CPT | Performed by: FAMILY MEDICINE

## 2020-02-03 PROCEDURE — G8482 FLU IMMUNIZE ORDER/ADMIN: HCPCS | Performed by: FAMILY MEDICINE

## 2020-02-03 PROCEDURE — 1036F TOBACCO NON-USER: CPT | Performed by: FAMILY MEDICINE

## 2020-02-03 PROCEDURE — 99213 OFFICE O/P EST LOW 20 MIN: CPT | Performed by: FAMILY MEDICINE

## 2020-02-03 PROCEDURE — G8419 CALC BMI OUT NRM PARAM NOF/U: HCPCS | Performed by: FAMILY MEDICINE

## 2020-02-03 RX ORDER — OSELTAMIVIR PHOSPHATE 75 MG/1
75 CAPSULE ORAL 2 TIMES DAILY
Qty: 10 CAPSULE | Refills: 0 | Status: SHIPPED | OUTPATIENT
Start: 2020-02-03 | End: 2020-02-08

## 2020-02-03 ASSESSMENT — ENCOUNTER SYMPTOMS
COUGH: 1
ALLERGIC/IMMUNOLOGIC NEGATIVE: 1
GASTROINTESTINAL NEGATIVE: 1
EYES NEGATIVE: 1
SORE THROAT: 1

## 2020-02-03 NOTE — PROGRESS NOTES
Head: Normocephalic. Right Ear: Tympanic membrane normal.      Left Ear: Tympanic membrane normal.      Nose: Congestion and rhinorrhea present. Neck:      Musculoskeletal: Normal range of motion. Pulmonary:      Effort: Pulmonary effort is normal. No respiratory distress. Breath sounds: No wheezing or rhonchi. Abdominal:      General: There is no distension. Musculoskeletal: Normal range of motion. Skin:     General: Skin is warm. Neurological:      Mental Status: She is alert. Psychiatric:         Mood and Affect: Mood normal.       Office Visit on 02/03/2020   Component Date Value Ref Range Status    Influenza A Ab 02/03/2020 negative   Final    Influenza B Ab 02/03/2020 positive   Final         ASSESSMENT/PLAN:  Influenza B  Discussed typical course, supportive care, and complications  tamiflu bid x 5 days  Recheck prn concerns    An electronic signature was used to authenticate this note.     --Denise Zafar MD on 2/3/2020 at 5:13 PM No bruits; no thyromegaly or nodules

## 2020-03-04 ENCOUNTER — OFFICE VISIT (OUTPATIENT)
Dept: OBGYN | Age: 30
End: 2020-03-04
Payer: MEDICAID

## 2020-03-04 ENCOUNTER — HOSPITAL ENCOUNTER (OUTPATIENT)
Age: 30
Setting detail: SPECIMEN
Discharge: HOME | End: 2020-03-04
Payer: MEDICAID

## 2020-03-04 VITALS
WEIGHT: 135 LBS | DIASTOLIC BLOOD PRESSURE: 78 MMHG | HEIGHT: 62 IN | HEART RATE: 72 BPM | SYSTOLIC BLOOD PRESSURE: 120 MMHG | BODY MASS INDEX: 24.84 KG/M2

## 2020-03-04 LAB
-: ABNORMAL
AMORPHOUS: ABNORMAL
BACTERIA: ABNORMAL
BILIRUBIN URINE: NEGATIVE
CASTS UA: ABNORMAL /LPF (ref 0–2)
COLOR: ABNORMAL
COMMENT UA: ABNORMAL
CRYSTALS, UA: ABNORMAL /HPF
EPITHELIAL CELLS UA: ABNORMAL /HPF (ref 0–5)
GLUCOSE URINE: NEGATIVE
KETONES, URINE: NEGATIVE
LEUKOCYTE ESTERASE, URINE: NEGATIVE
MUCUS: ABNORMAL
NITRITE, URINE: NEGATIVE
OTHER OBSERVATIONS UA: ABNORMAL
PH UA: 6.5 (ref 5–6)
PROTEIN UA: NEGATIVE
RBC UA: ABNORMAL /HPF (ref 0–4)
RENAL EPITHELIAL, UA: ABNORMAL /HPF
SPECIFIC GRAVITY UA: 1.02 (ref 1.01–1.02)
TRICHOMONAS: ABNORMAL
TURBIDITY: ABNORMAL
URINE HGB: NEGATIVE
UROBILINOGEN, URINE: NORMAL
WBC UA: ABNORMAL /HPF (ref 0–4)
YEAST: ABNORMAL

## 2020-03-04 PROCEDURE — 81001 URINALYSIS AUTO W/SCOPE: CPT

## 2020-03-04 PROCEDURE — G0145 SCR C/V CYTO,THINLAYER,RESCR: HCPCS

## 2020-03-04 PROCEDURE — 87624 HPV HI-RISK TYP POOLED RSLT: CPT

## 2020-03-04 PROCEDURE — G8482 FLU IMMUNIZE ORDER/ADMIN: HCPCS | Performed by: ADVANCED PRACTICE MIDWIFE

## 2020-03-04 PROCEDURE — 99395 PREV VISIT EST AGE 18-39: CPT | Performed by: ADVANCED PRACTICE MIDWIFE

## 2020-03-04 PROCEDURE — 87086 URINE CULTURE/COLONY COUNT: CPT

## 2020-03-04 PROCEDURE — 86403 PARTICLE AGGLUT ANTBDY SCRN: CPT

## 2020-03-04 ASSESSMENT — ENCOUNTER SYMPTOMS
GASTROINTESTINAL NEGATIVE: 1
EYES NEGATIVE: 1
RESPIRATORY NEGATIVE: 1

## 2020-03-04 ASSESSMENT — PATIENT HEALTH QUESTIONNAIRE - PHQ9
2. FEELING DOWN, DEPRESSED OR HOPELESS: 0
SUM OF ALL RESPONSES TO PHQ QUESTIONS 1-9: 0
SUM OF ALL RESPONSES TO PHQ QUESTIONS 1-9: 0
1. LITTLE INTEREST OR PLEASURE IN DOING THINGS: 0
SUM OF ALL RESPONSES TO PHQ9 QUESTIONS 1 & 2: 0

## 2020-03-04 NOTE — PROGRESS NOTES
NSSAVNT. Adnexa are palpable bilaterally and nontender. No abnormalities noted. Musculoskeletal: Normal range of motion. Skin:     General: Skin is warm and dry. Neurological:      Mental Status: She is alert and oriented to person, place, and time. Deep Tendon Reflexes: Reflexes are normal and symmetric. Sexually active: Yes  Any bleeding or pain with intercourse: No  Last Yearly:  5/2019  Last Pap 5/2019  ASCUS, Negative HR HPV  Do you do self breast exams: Yes    Assessment:      Diagnosis Orders   1. Women's annual routine gynecological examination     2. Screening for malignant neoplasm of cervix  PAP SMEAR   3. Urinary frequency  Urinalysis With Microscopic    Urine Culture           Plan:      Orders Placed This Encounter   Procedures    Urine Culture     Standing Status:   Future     Standing Expiration Date:   3/4/2021     Order Specific Question:   Specify (ex-cath, midstream, cysto, etc)? Answer:   ccms    PAP SMEAR     Patient History:    No LMP recorded. OBGYN Status: Recent pregnancy  Past Surgical History:  No date: APPENDECTOMY  12/16/2020: TUBAL LIGATION      Social History    Tobacco Use      Smoking status: Former Smoker        Years: 15.00        Types: Cigarettes      Smokeless tobacco: Never Used       Standing Status:   Future     Standing Expiration Date:   3/19/2021     Order Specific Question:   Collection Type     Answer: Thin Prep     Order Specific Question:   Prior Abnormal Pap Test     Answer:   Yes     Order Specific Question:   If Prior Abnormal, Give Date     Answer:   2017     Order Specific Question:   Prior Treatment     Answer:   None Given     Order Specific Question:   Screening or Diagnostic     Answer:   Screening     Order Specific Question:   HPV Requested?      Answer:   Yes - If Abnormal Reflex HPV     Order Specific Question:   High Risk Patient     Answer:   N/A    Urinalysis With Microscopic     Standing Status:   Future     Standing Expiration Date:   3/4/2021     Order Specific Question:   SPECIFY(EX-CATH,MIDSTREAM,CYSTO,ETC)? Answer:   ccms      Education: discussed diet with calcium intake 9836-3349 mg./day with weight bearing exercise 30-50 minutes 3-5x/week. SBE monthly, danger S&S, when to call. Hydration 64 oz. Water daily, continue with PNV as long as breastfeeding. RTO 12 months and prn.

## 2020-03-05 LAB
CULTURE: ABNORMAL
Lab: ABNORMAL
SPECIMEN DESCRIPTION: ABNORMAL

## 2020-03-09 ENCOUNTER — HOSPITAL ENCOUNTER (OUTPATIENT)
Dept: GENERAL RADIOLOGY | Age: 30
Discharge: HOME | End: 2020-03-11
Payer: MEDICAID

## 2020-03-09 ENCOUNTER — OFFICE VISIT (OUTPATIENT)
Dept: PRIMARY CARE CLINIC | Age: 30
End: 2020-03-09
Payer: MEDICAID

## 2020-03-09 VITALS
HEIGHT: 62 IN | SYSTOLIC BLOOD PRESSURE: 94 MMHG | DIASTOLIC BLOOD PRESSURE: 46 MMHG | OXYGEN SATURATION: 98 % | WEIGHT: 133 LBS | RESPIRATION RATE: 16 BRPM | TEMPERATURE: 97.2 F | BODY MASS INDEX: 24.48 KG/M2 | HEART RATE: 78 BPM

## 2020-03-09 PROCEDURE — G8427 DOCREV CUR MEDS BY ELIG CLIN: HCPCS | Performed by: FAMILY MEDICINE

## 2020-03-09 PROCEDURE — 1036F TOBACCO NON-USER: CPT | Performed by: FAMILY MEDICINE

## 2020-03-09 PROCEDURE — G8482 FLU IMMUNIZE ORDER/ADMIN: HCPCS | Performed by: FAMILY MEDICINE

## 2020-03-09 PROCEDURE — 73630 X-RAY EXAM OF FOOT: CPT

## 2020-03-09 PROCEDURE — 99214 OFFICE O/P EST MOD 30 MIN: CPT | Performed by: FAMILY MEDICINE

## 2020-03-09 PROCEDURE — 99212 OFFICE O/P EST SF 10 MIN: CPT

## 2020-03-09 PROCEDURE — G8420 CALC BMI NORM PARAMETERS: HCPCS | Performed by: FAMILY MEDICINE

## 2020-03-09 RX ORDER — CEPHALEXIN 500 MG/1
500 CAPSULE ORAL 2 TIMES DAILY
Qty: 10 CAPSULE | Refills: 0 | Status: SHIPPED | OUTPATIENT
Start: 2020-03-09 | End: 2020-03-14

## 2020-03-09 ASSESSMENT — ENCOUNTER SYMPTOMS
EYES NEGATIVE: 1
RESPIRATORY NEGATIVE: 1
GASTROINTESTINAL NEGATIVE: 1
ALLERGIC/IMMUNOLOGIC NEGATIVE: 1

## 2020-03-09 NOTE — PROGRESS NOTES
3/9/2020     Raffy Riding (:  1990) is a 34 y.o. female, here for evaluation of the following medical concerns:    HPI   Acute urgent care visit for injury to left 4th/5th toes yesterday. She was barefoot in the house and ran into a chair leg which went between the 4th and 5th toes. She felt a \"pop\" by report. Some bruising and light swelling since. Able to walk with a limp. Past Medical History:   Diagnosis Date    Abnormal Pap smear of cervix     colpo     Anxiety     Depression     resolve 2017     Past Surgical History:   Procedure Laterality Date    APPENDECTOMY      TUBAL LIGATION  2020       Review of Systems   Constitutional: Negative. HENT: Negative. Eyes: Negative. Respiratory: Negative. Cardiovascular: Negative. Gastrointestinal: Negative. Endocrine: Negative. Genitourinary: Negative. Musculoskeletal: Positive for arthralgias and gait problem. Skin: Negative. Allergic/Immunologic: Negative. Hematological: Negative. Psychiatric/Behavioral: Negative. Prior to Visit Medications    Medication Sig Taking? Authorizing Provider   acetaminophen (TYLENOL) 325 MG tablet Take 650 mg by mouth every 6 hours as needed for Pain Yes Historical Provider, MD        Social History     Tobacco Use    Smoking status: Former Smoker     Years: 15.00     Types: Cigarettes    Smokeless tobacco: Never Used   Substance Use Topics    Alcohol use: Not Currently        Vitals:    20 1046   BP: (!) 94/46   Pulse: 78   Resp: 16   Temp: 97.2 °F (36.2 °C)   SpO2: 98%   Weight: 133 lb (60.3 kg)   Height: 5' 2\" (1.575 m)     Estimated body mass index is 24.33 kg/m² as calculated from the following:    Height as of this encounter: 5' 2\" (1.575 m). Weight as of this encounter: 133 lb (60.3 kg). Physical Exam  Constitutional:       General: She is not in acute distress. Appearance: She is well-developed.    HENT:      Head: Normocephalic and

## 2020-03-09 NOTE — RESULT ENCOUNTER NOTE
Abnormal test results, please notify patient. Will send prescription into pharmacy of record.   DA/ENRIQUE

## 2020-03-16 LAB — CYTOLOGY REPORT: NORMAL

## 2020-04-24 ENCOUNTER — VIRTUAL VISIT (OUTPATIENT)
Dept: FAMILY MEDICINE CLINIC | Age: 30
End: 2020-04-24
Payer: MEDICAID

## 2020-04-24 ENCOUNTER — HOSPITAL ENCOUNTER (OUTPATIENT)
Dept: LAB | Age: 30
Discharge: HOME | End: 2020-04-24
Payer: MEDICAID

## 2020-04-24 VITALS — HEIGHT: 62 IN | BODY MASS INDEX: 24.84 KG/M2 | RESPIRATION RATE: 16 BRPM | WEIGHT: 135 LBS

## 2020-04-24 LAB
-: NORMAL
AMORPHOUS: NORMAL
BACTERIA: NORMAL
BILIRUBIN URINE: NEGATIVE
CASTS UA: NORMAL /LPF (ref 0–2)
COLOR: ABNORMAL
COMMENT UA: ABNORMAL
CRYSTALS, UA: NORMAL /HPF
EPITHELIAL CELLS UA: NORMAL /HPF (ref 0–5)
GLUCOSE URINE: NEGATIVE
KETONES, URINE: NEGATIVE
LEUKOCYTE ESTERASE, URINE: NEGATIVE
MUCUS: NORMAL
NITRITE, URINE: NEGATIVE
OTHER OBSERVATIONS UA: NORMAL
PH UA: 7.5 (ref 5–6)
PROTEIN UA: NEGATIVE
RBC UA: NORMAL /HPF (ref 0–4)
RENAL EPITHELIAL, UA: NORMAL /HPF
SPECIFIC GRAVITY UA: 1.02 (ref 1.01–1.02)
TRICHOMONAS: NORMAL
TURBIDITY: ABNORMAL
URINE HGB: NEGATIVE
UROBILINOGEN, URINE: NORMAL
WBC UA: NORMAL /HPF (ref 0–4)
YEAST: NORMAL

## 2020-04-24 PROCEDURE — 1036F TOBACCO NON-USER: CPT | Performed by: PHYSICIAN ASSISTANT

## 2020-04-24 PROCEDURE — 81001 URINALYSIS AUTO W/SCOPE: CPT

## 2020-04-24 PROCEDURE — 87086 URINE CULTURE/COLONY COUNT: CPT

## 2020-04-24 PROCEDURE — 99213 OFFICE O/P EST LOW 20 MIN: CPT | Performed by: PHYSICIAN ASSISTANT

## 2020-04-24 PROCEDURE — G8427 DOCREV CUR MEDS BY ELIG CLIN: HCPCS | Performed by: PHYSICIAN ASSISTANT

## 2020-04-24 PROCEDURE — 36415 COLL VENOUS BLD VENIPUNCTURE: CPT

## 2020-04-24 PROCEDURE — G8420 CALC BMI NORM PARAMETERS: HCPCS | Performed by: PHYSICIAN ASSISTANT

## 2020-04-24 RX ORDER — NITROFURANTOIN 25; 75 MG/1; MG/1
100 CAPSULE ORAL 2 TIMES DAILY
Qty: 14 CAPSULE | Refills: 0 | Status: SHIPPED | OUTPATIENT
Start: 2020-04-24 | End: 2020-05-01

## 2020-04-24 ASSESSMENT — PATIENT HEALTH QUESTIONNAIRE - PHQ9
1. LITTLE INTEREST OR PLEASURE IN DOING THINGS: 0
2. FEELING DOWN, DEPRESSED OR HOPELESS: 0
SUM OF ALL RESPONSES TO PHQ QUESTIONS 1-9: 0
SUM OF ALL RESPONSES TO PHQ9 QUESTIONS 1 & 2: 0
SUM OF ALL RESPONSES TO PHQ QUESTIONS 1-9: 0

## 2020-04-24 ASSESSMENT — ENCOUNTER SYMPTOMS
NAUSEA: 0
SHORTNESS OF BREATH: 0
COUGH: 0
VOMITING: 0
WHEEZING: 0
DIARRHEA: 0

## 2020-04-24 NOTE — PROGRESS NOTES
NEGATIVE Final    Bilirubin Urine 04/24/2020 NEGATIVE  NEGATIVE Final    Ketones, Urine 04/24/2020 NEGATIVE  NEGATIVE Final    Specific Gravity, UA 04/24/2020 1.020  1.010 - 1.025 Final    Urine Hgb 04/24/2020 NEGATIVE  NEGATIVE Final    pH, UA 04/24/2020 7.5* 5.0 - 6.0 Final    Protein, UA 04/24/2020 NEGATIVE  NEGATIVE Final    Urobilinogen, Urine 04/24/2020 Normal  Normal Final    Nitrite, Urine 04/24/2020 NEGATIVE  NEGATIVE Final    Leukocyte Esterase, Urine 04/24/2020 NEGATIVE  NEGATIVE Final    Urinalysis Comments 04/24/2020 NOT REPORTED   Final    - 04/24/2020        Final    WBC, UA 04/24/2020 0 TO 4  0 - 4 /HPF Final    RBC, UA 04/24/2020 0 TO 4  0 - 4 /HPF Final    Casts UA 04/24/2020 NOT REPORTED  0 - 2 /LPF Final    Crystals, UA 04/24/2020 NOT REPORTED  None /HPF Final    Epithelial Cells UA 04/24/2020 0 TO 4  0 - 5 /HPF Final    Renal Epithelial, UA 04/24/2020 NOT REPORTED  0 /HPF Final    Bacteria, UA 04/24/2020 None  None Final    Mucus, UA 04/24/2020 NOT REPORTED  None Final    Trichomonas, UA 04/24/2020 NOT REPORTED  None Final    Amorphous, UA 04/24/2020 NOT REPORTED  None Final    Other Observations UA 04/24/2020 NOT REPORTED  NOT REQ. Final    Yeast, UA 04/24/2020 NOT REPORTED  None Final     ASSESSMENT/PLAN:  1. Urinary symptom or sign    - Urinalysis Reflex to Culture; Future  - nitrofurantoin, macrocrystal-monohydrate, (MACROBID) 100 MG capsule; Take 1 capsule by mouth 2 times daily for 7 days  Dispense: 14 capsule; Refill: 0    2. Dysuria    - Urinalysis Reflex to Culture; Future  - nitrofurantoin, macrocrystal-monohydrate, (MACROBID) 100 MG capsule; Take 1 capsule by mouth 2 times daily for 7 days  Dispense: 14 capsule; Refill: 0    Fluids rest  Await C & S  Pt notified of urine results  Answered questions  Follow up not improving or worsens    Return if symptoms worsen or fail to improve.     Thaddeus Lima is a 34 y.o. female being evaluated by a Virtual Visit (video

## 2020-04-26 LAB
CULTURE: NO GROWTH
Lab: NORMAL
SPECIMEN DESCRIPTION: NORMAL

## 2020-06-18 ENCOUNTER — TELEPHONE (OUTPATIENT)
Dept: OPTOMETRY | Age: 30
End: 2020-06-18

## 2020-06-18 ENCOUNTER — VIRTUAL VISIT (OUTPATIENT)
Dept: FAMILY MEDICINE CLINIC | Age: 30
End: 2020-06-18
Payer: MEDICAID

## 2020-06-18 PROCEDURE — G8427 DOCREV CUR MEDS BY ELIG CLIN: HCPCS | Performed by: PHYSICIAN ASSISTANT

## 2020-06-18 PROCEDURE — 99214 OFFICE O/P EST MOD 30 MIN: CPT | Performed by: PHYSICIAN ASSISTANT

## 2020-06-18 RX ORDER — DULOXETIN HYDROCHLORIDE 20 MG/1
20 CAPSULE, DELAYED RELEASE ORAL DAILY
Qty: 30 CAPSULE | Refills: 0 | Status: SHIPPED | OUTPATIENT
Start: 2020-06-18 | End: 2020-07-23

## 2020-06-18 RX ORDER — CEPHALEXIN 500 MG/1
500 CAPSULE ORAL 2 TIMES DAILY
Qty: 14 CAPSULE | Refills: 0 | Status: SHIPPED | OUTPATIENT
Start: 2020-06-18 | End: 2020-06-25

## 2020-06-18 RX ORDER — BUSPIRONE HYDROCHLORIDE 5 MG/1
5 TABLET ORAL 3 TIMES DAILY PRN
Qty: 30 TABLET | Refills: 1 | Status: SHIPPED | OUTPATIENT
Start: 2020-06-18 | End: 2020-07-18

## 2020-06-18 ASSESSMENT — PATIENT HEALTH QUESTIONNAIRE - PHQ9
2. FEELING DOWN, DEPRESSED OR HOPELESS: 1
SUM OF ALL RESPONSES TO PHQ QUESTIONS 1-9: 2
1. LITTLE INTEREST OR PLEASURE IN DOING THINGS: 1
SUM OF ALL RESPONSES TO PHQ9 QUESTIONS 1 & 2: 2
SUM OF ALL RESPONSES TO PHQ QUESTIONS 1-9: 2

## 2020-06-18 ASSESSMENT — ENCOUNTER SYMPTOMS
WHEEZING: 0
COUGH: 0
VOMITING: 0
DIARRHEA: 0
NAUSEA: 0
SHORTNESS OF BREATH: 1

## 2020-06-18 NOTE — PROGRESS NOTES
2020    TELEHEALTH EVALUATION -- Audio/Visual (During EHGRJ-74 public health emergency)    HPI:    Johnie Mcardle (:  1990) has requested an audio/video evaluation for the following concern(s):    Patient is seen through telehealth visit today. She wants to discuss her worsening anxiety and depression. Anxiety is through the roof past several months. She has been awaking with feelings of impending doom panic attacks at night. She does blame current world events in the pandemic for a lot of her symptoms. She is just not dealing and coping like she would like to she gets a rapid heartbeat short of breath when she gets panicky. Having trouble sleeping every night. She will get up and feed the baby and then is struggling. Cannot go back to sleep. She is still breast-feeding. She will drink wine and beer to cope and she knows that is not the appropriate thing to do. Her best friend recently lost her fiancé he  and it was a week after he proposed to her and this plays on her mind she frets and worries about losing people losing her children she worries of some things going to happen her even just driving into town. Decreased libido has no physical feeling at this time. She is going to be getting  in a month. Her son will go to his dad's house which is 2 hours away and she freaks out worrying if something is going to happen to him. She has mind racing racing thoughts. Cannot get rid of these thoughts when she has them. Gets headaches and lightheaded. Does have panic attacks during the day as well. She is tearful all the time. She has good support with her best friend and she talks to her fiancé. She has been eating properly exercising going to the gym. Celexa but she did not like how she felt on it did not tolerate it she also years ago was on Zoloft 150 mg daily at one point. Does not want to go back on that.     She has been so worried about world events they put up Abnormal -         Discharge [x]  None visible  [] Abnormal -    HENT:   [x] Normocephalic, atraumatic. [] Abnormal   [] Mouth/Throat: Mucous membranes are moist.     External Ears [] Normal  [] Abnormal-     Neck: [x] No visualized mass     Pulmonary/Chest: [x] Respiratory effort normal.  [x] No visualized signs of difficulty breathing or respiratory distress        [] Abnormal-      Musculoskeletal:   [x] Normal gait with no signs of ataxia         [x] Normal range of motion of neck        [] Abnormal-       Neurological:        [x] No Facial Asymmetry (Cranial nerve 7 motor function) (limited exam to video visit)          [x] No gaze palsy        [] Abnormal-         Skin:        [x] No significant exanthematous lesions or discoloration noted on facial skin I did not really notice any significant rash or lesions on the back visualization was somewhat difficult with the lighting but even her fiancé did not notice anything either.     [] Abnormal-            Psychiatric:       [x] Normal Affect [x] No Hallucinations     speech is normal responds appropriately question she is not tearful but she is holding back to tears and she states that as well. Good eye contact. She is coherent. No evidence for suicide homicidal ideation. Thought processing content appropriate. She is not overly fidgety she sits calmly or when she is walking not tremulous. Good eye contact. [] Abnormal-     Other pertinent observable physical exam findings-     ASSESSMENT/PLAN:  1. Anxiety with depression    - busPIRone (BUSPAR) 5 MG tablet; Take 1 tablet by mouth 3 times daily as needed (anxiety)  Dispense: 30 tablet; Refill: 1  - DULoxetine (CYMBALTA) 20 MG extended release capsule; Take 1 capsule by mouth daily  Dispense: 30 capsule; Refill: 0  - TSH; Future  - T4, Free; Future    2. Generalized anxiety disorder with panic attacks    - busPIRone (BUSPAR) 5 MG tablet;  Take 1 tablet by mouth 3 times daily as needed (anxiety) Dispense: 30 tablet; Refill: 1  - DULoxetine (CYMBALTA) 20 MG extended release capsule; Take 1 capsule by mouth daily  Dispense: 30 capsule; Refill: 0  - TSH; Future  - T4, Free; Future    3. Skin infection    - cephALEXin (KEFLEX) 500 MG capsule; Take 1 capsule by mouth 2 times daily for 7 days  Dispense: 14 capsule; Refill: 0    Coping mechanisms  She will be notified of lab findings  I went ahead and treated her with Keflex to be safe she also stopped tanning which I think is a good idea  To contact me email me through my chart or call the office of any problems with medications  Answered her questions  Good nutrition hydration  Follow-up with me in 2 weeks sooner if problems    Return in about 2 weeks (around 7/2/2020). Glenn Morales is a 34 y.o. female being evaluated by a Virtual Visit (video visit) encounter to address concerns as mentioned above. A caregiver was present when appropriate. Due to this being a TeleHealth encounter (During [facility]-94 public health emergency), evaluation of the following organ systems was limited: Vitals/Constitutional/EENT/Resp/CV/GI//MS/Neuro/Skin/Heme-Lymph-Imm. Pursuant to the emergency declaration under the [address] [place] [address], [address] [place] [address] authority and the [employer] and [employer] Act, this Virtual Visit was conducted with patient's (and/or legal guardian's) consent, to reduce the patient's risk of exposure to COVID-19 and provide necessary medical care. The patient (and/or legal guardian) has also been advised to contact this office for worsening conditions or problems, and seek emergency medical treatment and/or call 911 if deemed necessary. Patient identification was verified at the start of the visit: Yes    Total time spent on this encounter: 20-30 minutes    Services were provided through a video synchronous discussion virtually to substitute for in-person clinic visit.  Patient and

## 2020-06-18 NOTE — TELEPHONE ENCOUNTER
Patient is scheduled for an appointment on 7/29/20, but is out of contacts and is getting  on 7/11/20. Gave patient tirals to get her through her wedding.      Oli Patel   Right [employer] biotru daily  -1.25   Left [employer] biotru daily  -1.25   Expiration Date: 1/17/2020

## 2020-06-22 ENCOUNTER — HOSPITAL ENCOUNTER (OUTPATIENT)
Dept: LAB | Age: 30
Discharge: HOME | End: 2020-06-22
Payer: MEDICAID

## 2020-06-22 LAB
THYROXINE, FREE: 1.21 NG/DL (ref 0.93–1.7)
TSH SERPL DL<=0.05 MIU/L-ACNC: 2.07 MIU/L (ref 0.3–5)

## 2020-06-22 PROCEDURE — 84439 ASSAY OF FREE THYROXINE: CPT

## 2020-06-22 PROCEDURE — 36415 COLL VENOUS BLD VENIPUNCTURE: CPT

## 2020-06-22 PROCEDURE — 84443 ASSAY THYROID STIM HORMONE: CPT

## 2020-07-08 ENCOUNTER — VIRTUAL VISIT (OUTPATIENT)
Dept: FAMILY MEDICINE CLINIC | Age: 30
End: 2020-07-08
Payer: MEDICAID

## 2020-07-08 PROCEDURE — G8420 CALC BMI NORM PARAMETERS: HCPCS | Performed by: PHYSICIAN ASSISTANT

## 2020-07-08 PROCEDURE — 1036F TOBACCO NON-USER: CPT | Performed by: PHYSICIAN ASSISTANT

## 2020-07-08 PROCEDURE — G8427 DOCREV CUR MEDS BY ELIG CLIN: HCPCS | Performed by: PHYSICIAN ASSISTANT

## 2020-07-08 PROCEDURE — 99213 OFFICE O/P EST LOW 20 MIN: CPT | Performed by: PHYSICIAN ASSISTANT

## 2020-07-08 ASSESSMENT — PATIENT HEALTH QUESTIONNAIRE - PHQ9
2. FEELING DOWN, DEPRESSED OR HOPELESS: 0
SUM OF ALL RESPONSES TO PHQ QUESTIONS 1-9: 0
SUM OF ALL RESPONSES TO PHQ9 QUESTIONS 1 & 2: 0
SUM OF ALL RESPONSES TO PHQ QUESTIONS 1-9: 0
1. LITTLE INTEREST OR PLEASURE IN DOING THINGS: 0

## 2020-07-08 NOTE — PROGRESS NOTES
2020    TELEHEALTH EVALUATION -- Audio/Visual (During PMOAP-05 public health emergency)    HPI:    Fabian Gilbert (:  1990) has requested an audio/video evaluation for the following concern(s):    Patient is seen today through virtual visit following up on anxiety and depression. I currently have her on BuSpar she has only taken that a couple times and it helps she states. She is also on Cymbalta. She states she is more tired than normal.  Last week was okay. BuSpar works great for columns are and she can sleep. With the Cymbalta it takes her a few minutes to process things. Yesterday she had a headache all day this is the first time she has had a headache since I last saw her. She does not feel overwhelmed. Denies suicide homicidal ideation. Is not tearful anymore and is not snappy or irritable. Not having panic attacks at night like she was with a feeling of impending doom. Sleep is much better. Mind races at times but she feels that this is normal  Review of Systems   Constitutional: Positive for fatigue. Negative for appetite change, chills and fever. HENT: Negative for congestion, postnasal drip, rhinorrhea, sinus pressure, sinus pain, sneezing, sore throat and trouble swallowing. Eyes: Negative for visual disturbance. Respiratory: Negative for cough, chest tightness, shortness of breath and wheezing. Cardiovascular: Negative for chest pain and palpitations. Gastrointestinal: Negative for diarrhea, nausea and vomiting. Genitourinary: Negative for difficulty urinating and dysuria. Musculoskeletal: Negative for myalgias. Skin: Negative for rash. Neurological: Positive for headaches. Negative for dizziness, weakness, light-headedness and numbness. Headaches and dizziness not as bad as they had been. Psychiatric/Behavioral: Negative for agitation, self-injury, sleep disturbance and suicidal ideas. The patient is nervous/anxious.         Prior to Visit Medications    Medication Sig Taking?  Authorizing Provider   busPIRone (BUSPAR) 5 MG tablet Take 1 tablet by mouth 3 times daily as needed (anxiety) Yes FROYLAN Barr   DULoxetine (CYMBALTA) 20 MG extended release capsule Take 1 capsule by mouth daily Yes FROYLAN Barr   acetaminophen (TYLENOL) 325 MG tablet Take 650 mg by mouth every 6 hours as needed for Pain Yes Historical Provider, MD       Social History     Tobacco Use    Smoking status: Former Smoker     Years: 15.00     Types: Cigarettes    Smokeless tobacco: Never Used   Substance Use Topics    Alcohol use: Not Currently    Drug use: Not Currently        Allergies   Allergen Reactions    Seasonal    ,   Past Medical History:   Diagnosis Date    Abnormal Pap smear of cervix     colpo 2017    Anxiety     Depression     resolve 2017   ,   Past Surgical History:   Procedure Laterality Date    APPENDECTOMY      TUBAL LIGATION  12/16/2020   ,   Social History     Tobacco Use    Smoking status: Former Smoker     Years: 15.00     Types: Cigarettes    Smokeless tobacco: Never Used   Substance Use Topics    Alcohol use: Not Currently    Drug use: Not Currently   ,   Family History   Problem Relation Age of Onset    Other Other         daughter-heart murmur    Diabetes Maternal Grandmother     Cataracts Maternal Grandmother     Heart Disease Mother         four open heart surgeries    Glaucoma Neg Hx    ,   Immunization History   Administered Date(s) Administered    Influenza, Quadv, IM, PF (6 mo and older Fluzone, Flulaval, Fluarix, and 3 yrs and older Afluria) 10/22/2019    MMR 11/07/2018    Tdap (Boostrix, Adacel) 08/28/2018, 08/20/2019   ,   Health Maintenance   Topic Date Due    Varicella vaccine (1 of 2 - 2-dose childhood series) 11/11/1993    Flu vaccine (1) 09/01/2020    Cervical cancer screen  03/04/2023    DTaP/Tdap/Td vaccine (3 - Td) 08/20/2029    HIV screen  Completed    Hepatitis A vaccine  Aged C/ [place] 19 Hepatitis B vaccine  Aged Out    Hib vaccine  Aged Out    Meningococcal (ACWY) vaccine  Aged Out    Pneumococcal 0-64 years Vaccine  Aged Out       PHYSICAL EXAMINATION:  [ INSTRUCTIONS:  \"[x]\" Indicates a positive item  \"[]\" Indicates a negative item  -- DELETE ALL ITEMS NOT EXAMINED]  Vital Signs: (As obtained by patient/caregiver or practitioner observation)    Blood pressure-  Heart rate-    Respiratory rate- WNL   Temperature-  Pulse oximetry-     Constitutional: [x] Appears well-developed and well-nourished [x] No apparent distress      [] Abnormal-   Mental status  [x] Alert and awake  [x] Oriented to person/place/time [x]Able to follow commands      Eyes:  EOM    [x]  Normal  [] Abnormal-  Sclera  [x]  Normal  [] Abnormal -         Discharge [x]  None visible  [] Abnormal -    HENT:   [x] Normocephalic, atraumatic. [] Abnormal   [] Mouth/Throat: Mucous membranes are moist.     External Ears [x] Normal  [] Abnormal-     Neck: [x] No visualized mass     Pulmonary/Chest: [x] Respiratory effort normal.  [x] No visualized signs of difficulty breathing or respiratory distress        [] Abnormal-      Musculoskeletal:   [] Normal gait with no signs of ataxia         [x] Normal range of motion of neck        [] Abnormal-       Neurological:        [x] No Facial Asymmetry (Cranial nerve 7 motor function) (limited exam to video visit)          [x] No gaze palsy        [] Abnormal-         Skin:        [x] No significant exanthematous lesions or discoloration noted on facial skin         [] Abnormal-            Psychiatric:       [x] Normal Affect [x] No Hallucinations   She has normal responds appropriately question mood and affect appropriate. She is not tearful smiling very interactive. No evidence for suicide homicidal ideation. She is coherent. Good eye contact. [] Abnormal-     Other pertinent observable physical exam findings-     ASSESSMENT/PLAN:  1.  Generalized anxiety disorder with panic

## 2020-07-15 ASSESSMENT — ENCOUNTER SYMPTOMS
TROUBLE SWALLOWING: 0
VOMITING: 0
SINUS PAIN: 0
SHORTNESS OF BREATH: 0
NAUSEA: 0
SORE THROAT: 0
CHEST TIGHTNESS: 0
DIARRHEA: 0
COUGH: 0
SINUS PRESSURE: 0
WHEEZING: 0
RHINORRHEA: 0

## 2020-07-23 NOTE — TELEPHONE ENCOUNTER
Nate Covert called requesting a refill of the below medication which has been pended for you:     Requested Prescriptions     Pending Prescriptions Disp Refills    DULoxetine (CYMBALTA) 20 MG extended release capsule [Pharmacy Med Name: DULOXETINE HCL 20MG CPEP] 30 capsule 0     Sig: TAKE 1 CAPSULE BY MOUTH ONE TIME A DAY       Last Appointment Date: 7/8/2020  Next Appointment Date: 8/5/2020    Allergies   Allergen Reactions    Seasonal

## 2020-07-27 RX ORDER — DULOXETIN HYDROCHLORIDE 20 MG/1
CAPSULE, DELAYED RELEASE ORAL
Qty: 30 CAPSULE | Refills: 5 | Status: SHIPPED
Start: 2020-07-27 | End: 2020-11-13 | Stop reason: DRUGHIGH

## 2020-07-29 ENCOUNTER — OFFICE VISIT (OUTPATIENT)
Dept: OPTOMETRY | Age: 30
End: 2020-07-29
Payer: MEDICAID

## 2020-07-29 PROCEDURE — 92014 COMPRE OPH EXAM EST PT 1/>: CPT | Performed by: OPTOMETRIST

## 2020-07-29 PROCEDURE — 92083 EXTENDED VISUAL FIELD XM: CPT | Performed by: OPTOMETRIST

## 2020-07-29 ASSESSMENT — KERATOMETRY
OS_AXISANGLE_DEGREES: 085
OS_K2POWER_DIOPTERS: 44.75
OS_K1POWER_DIOPTERS: 43.75
OS_AXISANGLE2_DEGREES: 175

## 2020-07-29 ASSESSMENT — REFRACTION_CURRENTRX
OD_SPHERE: -1.25
OS_SPHERE: -1.25

## 2020-07-29 ASSESSMENT — REFRACTION_MANIFEST
OS_AXIS: 170
OS_SPHERE: -0.50
OD_AXIS: 180
OD_CYLINDER: -0.75
OS_CYLINDER: -0.50
OD_SPHERE: -0.75

## 2020-07-29 ASSESSMENT — REFRACTION_WEARINGRX
OD_AXIS: 180
SPECS_TYPE: SVL
OS_CYLINDER: -0.50
OD_SPHERE: -1.00
OS_SPHERE: -1.00
OS_AXIS: 175
OD_CYLINDER: -0.50

## 2020-07-29 ASSESSMENT — SLIT LAMP EXAM - LIDS
COMMENTS: NORMAL
COMMENTS: NORMAL

## 2020-07-29 ASSESSMENT — VISUAL ACUITY
OS_CC: 20/50
METHOD: SNELLEN - LINEAR
CORRECTION_TYPE: CONTACTS

## 2020-07-29 ASSESSMENT — TONOMETRY
IOP_METHOD: NON-CONTACT AIR PUFF
OD_IOP_MMHG: 18
OS_IOP_MMHG: 16

## 2020-07-29 NOTE — PROGRESS NOTES
Nisha Almanza presents today for   Chief Complaint   Patient presents with    Blurred Vision    Vision Exam   .    HPI     Blurred Vision     In both eyes. Vision is blurred. Context:  distance vision.               Comments     Last Vision Exam: 1/16/2019 Aw  Last Ophthalmology Exam: n/a  Last Filled Glasses Rx: 1/16/2019  Insurance: March / [facility]  Update: Charlie Deleon / Axel Mansfield is getting more blurry  Will use insurance towards contacts and pay out of pocket for glasses  Having more headaches, driving and reading road signs are getting more blurry                       Family History   Problem Relation Age of Onset    Other Other         daughter-heart murmur    Diabetes Maternal Grandmother     Cataracts Maternal Grandmother     Heart Disease Mother         four open heart surgeries    Glaucoma Neg Hx        Social History     Socioeconomic History    Marital status:      Spouse name: None    Number of children: None    Years of education: None    Highest education level: None   Occupational History    None   Social Needs    Financial resource strain: None    Food insecurity     Worry: None     Inability: None    Transportation needs     Medical: None     Non-medical: None   Tobacco Use    Smoking status: Former Smoker     Years: 15.00     Types: Cigarettes    Smokeless tobacco: Never Used   Substance and Sexual Activity    Alcohol use: Not Currently    Drug use: Not Currently    Sexual activity: Yes     Partners: Male   Lifestyle    Physical activity     Days per week: None     Minutes per session: None    Stress: None   Relationships    Social connections     Talks on phone: None     Gets together: None     Attends [employer] service: None     Active member of club or organization: None     Attends meetings of clubs or organizations: None     Relationship status: None    Intimate partner violence     Fear of current or ex partner: None     Emotionally abused: None Physically abused: None     Forced sexual activity: None   Other Topics Concern    None   Social History Narrative    ** Merged History Encounter **         ** Merged History Encounter **          Past Medical History:   Diagnosis Date    Abnormal Pap smear of cervix     colpo 2017    Anxiety     Depression     resolve 2017       Neuro/Psych     Neuro/Psych     Oriented x3:  Yes    Mood/Affect:  Normal                Main Ophthalmology Exam     External Exam       Right Left    External Normal Normal          Slit Lamp Exam       Right Left    Lids/Lashes Normal Normal    Conjunctiva/Sclera White and quiet White and quiet    Cornea Clear Clear    Anterior Chamber Deep and quiet Deep and quiet    Iris Round and reactive Round and reactive    Lens Clear Clear    Vitreous Normal Normal          Fundus Exam       Right Left    Disc Normal Normal    C/D Ratio 0.2 0.2    Macula Normal Normal    Vessels Normal Normal                  Tonometry     Tonometry (Non-contact air puff, 1:22 PM)       Right Left    Pressure 18 16   IOPg:  15.5             14.4  CH:  8.4          9.1  WS: 4.4          7.1                     Visual Acuity (Snellen - Linear)       Right Left    Dist cc 20/40 20/50    Correction:  Contacts        Keratometry     Keratometry       K1 Axis K2 Axis    Right        Left 43.75 175 44.75 085                Ophthalmology Exam     Wearing Rx       Sphere Cylinder Axis    Right -1.00 -0.50 180    Left -1.00 -0.50 175    Age:  3yr    Type:  SVL                Manifest Refraction     Manifest Refraction       Sphere Cylinder Axis Dist [place]    Right -0.75 -0.75 180 20/25    Left -0.50 -0.50 170 20/25          Manifest Refraction #2 (Auto)       Sphere Cylinder Axis Dist [place]    Right -0.50 -0.75 002     Left -0.25 -0.50 154                  Final Rx       Sphere Cylinder Axis    Right -0.75 -0.75 180    Left -0.50 -0.50 170    Type:  SVL    Expiration Date:  7/30/2022           Contact Lens Current Rx Current Contact Lens Rx (Ordered)       Brand Sphere    Right [employer] biotru daily  -1.25    Left [employer] biotru daily  -1.25                Final    Final Contact Lens Rx       Brand Sphere    Right [employer] biotru daily  -1.00    Left [employer] biotru daily  -1.00    Expiration Date:  7/30/2021    Replacement:  Daily    Wearing Schedule:  Daily wear   Final           Plan:     1. Myopia of both eyes with astigmatism             Patient Instructions   New contact lens trials given;   Ok to order when pays $40 fit fee   2 boxes will be ordered      Return in about 1 year (around 7/29/2021) for complete eye exam.

## 2020-08-21 ENCOUNTER — VIRTUAL VISIT (OUTPATIENT)
Dept: FAMILY MEDICINE CLINIC | Age: 30
End: 2020-08-21
Payer: MEDICAID

## 2020-08-21 PROCEDURE — G8427 DOCREV CUR MEDS BY ELIG CLIN: HCPCS | Performed by: PHYSICIAN ASSISTANT

## 2020-08-21 PROCEDURE — 99214 OFFICE O/P EST MOD 30 MIN: CPT | Performed by: PHYSICIAN ASSISTANT

## 2020-08-21 RX ORDER — BUSPIRONE HYDROCHLORIDE 5 MG/1
TABLET ORAL
COMMUNITY
Start: 2020-07-29 | End: 2020-10-21 | Stop reason: SDUPTHER

## 2020-08-21 RX ORDER — DULOXETIN HYDROCHLORIDE 20 MG/1
20 CAPSULE, DELAYED RELEASE ORAL DAILY
Qty: 30 CAPSULE | Refills: 3 | Status: SHIPPED
Start: 2020-08-21 | End: 2020-10-30 | Stop reason: SDUPTHER

## 2020-08-21 RX ORDER — POLYETHYLENE GLYCOL 3350 17 G/17G
17 POWDER, FOR SOLUTION ORAL 2 TIMES DAILY
Qty: 225 G | Refills: 2 | Status: SHIPPED | OUTPATIENT
Start: 2020-08-21 | End: 2020-09-20

## 2020-08-21 ASSESSMENT — ENCOUNTER SYMPTOMS
NAUSEA: 0
DIARRHEA: 0
COUGH: 0
SHORTNESS OF BREATH: 0
WHEEZING: 0
CHEST TIGHTNESS: 0
CONSTIPATION: 1
VOMITING: 0

## 2020-08-21 NOTE — PROGRESS NOTES
2020    TELEHEALTH EVALUATION -- Audio/Visual (During TDJED-03 public health emergency)    HPI:    Jerri Rojas (:  1990) has requested an audio/video evaluation for the following concern(s):    Som Marlow is seen today 1 month follow-up on medications anxiety. She has been taking medication every other day. This is working well for her. Feels like should take daily and see how it goes. Has good and bad days yet. The past week has been stressful. Her parents are having marital issues. This is upsetting her. Everyone is coming to her with their issues and this is overwhelming. The medications are helping. Extra stressed due to external factors. With meds feels handling things in general.  Has been down the past few days not sure why. No recent illness. Does need refills. The buspar really helps. It makes her feel better. Takes maybe once a day it varies on stress. Review of Systems   Constitutional: Positive for fatigue. Negative for appetite change, chills and fever. When has a bad day stress eats. HENT: Negative. Respiratory: Negative for cough, chest tightness, shortness of breath and wheezing. Cardiovascular: Negative for chest pain and palpitations. Gastrointestinal: Positive for constipation. Negative for diarrhea, nausea and vomiting. Has had constipation her whole life. Takes miralax and stool softeners and still happens to her. Gets sweaty when defecates at times. Genitourinary: Negative for difficulty urinating. Musculoskeletal: Negative for myalgias. Skin: Negative for rash. Neurological: Positive for seizures. Negative for light-headedness, numbness and headaches. When was younger had seizures. Would have syncope and then have a seizure. Psychiatric/Behavioral: Negative for self-injury, sleep disturbance and suicidal ideas. The patient is nervous/anxious. Sleep is better but really tired. Awakens tired.      Was suicidal on celexa.  zoloft not a lot of memories was flat. Prior to Visit Medications    Medication Sig Taking?  Authorizing Provider   DULoxetine (CYMBALTA) 20 MG extended release capsule Take 1 capsule by mouth daily Yes FROYLAN Ayala   polyethylene glycol [facility]) 17 GM/SCOOP powder Take 17 g by mouth 2 times daily Yes FROYLAN Ayala   busPIRone (BUSPAR) 5 MG tablet   Historical Provider, MD   DULoxetine (CYMBALTA) 20 MG extended release capsule TAKE 1 CAPSULE BY MOUTH ONE TIME A DAY  FROYLAN Ayala   acetaminophen (TYLENOL) 325 MG tablet Take 650 mg by mouth every 6 hours as needed for Pain  Historical Provider, MD       Social History     Tobacco Use    Smoking status: Former Smoker     Years: 15.00     Types: Cigarettes    Smokeless tobacco: Never Used   Substance Use Topics    Alcohol use: Not Currently    Drug use: Not Currently        Allergies   Allergen Reactions    Seasonal    ,   Past Medical History:   Diagnosis Date    Abnormal Pap smear of cervix     colpo 2017    Anxiety     Depression     resolve 2017   ,   Past Surgical History:   Procedure Laterality Date    APPENDECTOMY      TUBAL LIGATION  12/16/2020   ,   Social History     Tobacco Use    Smoking status: Former Smoker     Years: 15.00     Types: Cigarettes    Smokeless tobacco: Never Used   Substance Use Topics    Alcohol use: Not Currently    Drug use: Not Currently   ,   Family History   Problem Relation Age of Onset    Other Other         daughter-heart murmur    Diabetes Maternal Grandmother     Cataracts Maternal Grandmother     Heart Disease Mother         four open heart surgeries    Glaucoma Neg Hx    ,   Immunization History   Administered Date(s) Administered    Influenza, Quadv, IM, PF (6 mo and older Fluzone, Flulaval, Fluarix, and 3 yrs and older Afluria) 10/22/2019    MMR 11/07/2018    Tdap (Boostrix, Adacel) 08/28/2018, 08/20/2019   ,   Health Maintenance   Topic Date Due    Varicella vaccine (1 of 2 - 2-dose childhood series) 11/11/1993    Flu vaccine (1) 09/01/2020    Cervical cancer screen  03/04/2023    DTaP/Tdap/Td vaccine (3 - Td) 08/20/2029    HIV screen  Completed    Hepatitis A vaccine  Aged Out    Hepatitis B vaccine  Aged Out    Hib vaccine  Aged Out    Meningococcal (ACWY) vaccine  Aged Out    Pneumococcal 0-64 years Vaccine  Aged Out       PHYSICAL EXAMINATION:  [ INSTRUCTIONS:  \"[x]\" Indicates a positive item  \"[]\" Indicates a negative item  -- DELETE ALL ITEMS NOT EXAMINED]  Vital Signs: (As obtained by patient/caregiver or practitioner observation)    Blood pressure-  Heart rate-    Respiratory rate- WNL   Temperature-  Pulse oximetry-     Constitutional: [x] Appears well-developed and well-nourished [x] No apparent distress      [] Abnormal-   Mental status  [x] Alert and awake  [x] Oriented to person/place/time [x]Able to follow commands      Eyes:  EOM    [x]  Normal  [] Abnormal-  Sclera  [x]  Normal  [] Abnormal -         Discharge [x]  None visible  [] Abnormal -    HENT:   [x] Normocephalic, atraumatic. [] Abnormal   [] Mouth/Throat: Mucous membranes are moist.     External Ears [x] Normal  [] Abnormal-     Neck: [x] No visualized mass     Pulmonary/Chest: [x] Respiratory effort normal.  [x] No visualized signs of difficulty breathing or respiratory distress        [] Abnormal-      Musculoskeletal:   [] Normal gait with no signs of ataxia         [x] Normal range of motion of neck        [] Abnormal-       Neurological:        [x] No Facial Asymmetry (Cranial nerve 7 motor function) (limited exam to video visit)          [x] No gaze palsy        [] Abnormal-         Skin:        [x] No significant exanthematous lesions or discoloration noted on facial skin         [] Abnormal-            Psychiatric:       [x] Normal Affect [x] No Hallucinations  Speech nl responds appropriately when questioned good eye contact. She is coherent.   Not tearful or anxious. No evidence for suicide or homicidal ideation. She is calm sitting in her car. Not anxious. [] Abnormal-     Other pertinent observable physical exam findings-     ASSESSMENT/PLAN:  1. Constipation, unspecified constipation type    - polyethylene glycol (GLYCOLAX) 17 GM/SCOOP powder; Take 17 g by mouth 2 times daily  Dispense: 225 g; Refill: 2    2. Generalized anxiety disorder with panic attacks    - DULoxetine (CYMBALTA) 20 MG extended release capsule; Take 1 capsule by mouth daily  Dispense: 30 capsule; Refill: 3    3. Anxiety with depression    - DULoxetine (CYMBALTA) 20 MG extended release capsule; Take 1 capsule by mouth daily  Dispense: 30 capsule; Refill: 3        Coping mechanisms  Recommend flu shot October 2020  Good nutrition hydration  Take cymbalta daily and see how she does if fatigue is too problematic will consider trying paxil  Will not give wellbutrin given history of seizure like activity with syncope  Answered her questions  Lifestyle changes  Follow up 3 weeks sooner if problems  Discussed how to take miralax bid and then go to qd once stools are soft              Return in about 3 weeks (around 9/11/2020). Fabian Gilbert is a 34 y.o. female being evaluated by a Virtual Visit (video visit) encounter to address concerns as mentioned above. A caregiver was present when appropriate. Due to this being a TeleHealth encounter (During OYNCT-62 public health emergency), evaluation of the following organ systems was limited: Vitals/Constitutional/EENT/Resp/CV/GI//MS/Neuro/Skin/Heme-Lymph-Imm. Pursuant to the emergency declaration under the [address] [place] [address], [address] [place] [address] and the [employer] and [employer] Act, this Virtual Visit was conducted with patient's (and/or legal guardian's) consent, to reduce the patient's risk of exposure to COVID-19 and provide necessary medical care.   The patient (and/or legal guardian) has also been advised to contact this office for worsening conditions or problems, and seek emergency medical treatment and/or call 911 if deemed necessary. Patient identification was verified at the start of the visit: Yes    Total time spent on this encounter: 20-25 minutes    Services were provided through a video synchronous discussion virtually to substitute for in-person clinic visit. Patient and provider were located at their individual homes. --FROYLAN Campbell on 8/22/2020 at 9:51 AM    An electronic signature was used to authenticate this note.

## 2020-10-21 RX ORDER — DULOXETIN HYDROCHLORIDE 20 MG/1
CAPSULE, DELAYED RELEASE ORAL
Qty: 30 CAPSULE | Refills: 5 | Status: CANCELLED | OUTPATIENT
Start: 2020-10-21

## 2020-10-23 RX ORDER — BUSPIRONE HYDROCHLORIDE 5 MG/1
5 TABLET ORAL 3 TIMES DAILY PRN
Qty: 30 TABLET | Refills: 5 | Status: SHIPPED | OUTPATIENT
Start: 2020-10-23 | End: 2022-02-02 | Stop reason: ALTCHOICE

## 2020-10-30 ENCOUNTER — VIRTUAL VISIT (OUTPATIENT)
Dept: FAMILY MEDICINE CLINIC | Age: 30
End: 2020-10-30
Payer: MEDICAID

## 2020-10-30 PROCEDURE — 99211 OFF/OP EST MAY X REQ PHY/QHP: CPT

## 2020-10-30 PROCEDURE — G8427 DOCREV CUR MEDS BY ELIG CLIN: HCPCS | Performed by: PHYSICIAN ASSISTANT

## 2020-10-30 PROCEDURE — L3908 WHO COCK-UP NONMOLDE PRE OTS: HCPCS | Performed by: PHYSICIAN ASSISTANT

## 2020-10-30 PROCEDURE — 99214 OFFICE O/P EST MOD 30 MIN: CPT | Performed by: PHYSICIAN ASSISTANT

## 2020-10-30 RX ORDER — METHYLPREDNISOLONE 4 MG/1
TABLET ORAL
Qty: 1 KIT | Refills: 0 | Status: SHIPPED | OUTPATIENT
Start: 2020-10-30 | End: 2020-11-13 | Stop reason: ALTCHOICE

## 2020-10-30 RX ORDER — DULOXETIN HYDROCHLORIDE 30 MG/1
30 CAPSULE, DELAYED RELEASE ORAL DAILY
Qty: 30 CAPSULE | Refills: 0 | Status: SHIPPED | OUTPATIENT
Start: 2020-10-30 | End: 2020-12-18 | Stop reason: SDUPTHER

## 2020-10-30 ASSESSMENT — ENCOUNTER SYMPTOMS
SHORTNESS OF BREATH: 0
NAUSEA: 0
DIARRHEA: 0
COUGH: 0
WHEEZING: 0
VOMITING: 0

## 2020-10-30 ASSESSMENT — PATIENT HEALTH QUESTIONNAIRE - PHQ9
1. LITTLE INTEREST OR PLEASURE IN DOING THINGS: 0
SUM OF ALL RESPONSES TO PHQ QUESTIONS 1-9: 0
SUM OF ALL RESPONSES TO PHQ9 QUESTIONS 1 & 2: 0
2. FEELING DOWN, DEPRESSED OR HOPELESS: 0
SUM OF ALL RESPONSES TO PHQ QUESTIONS 1-9: 0
SUM OF ALL RESPONSES TO PHQ QUESTIONS 1-9: 0

## 2020-10-30 NOTE — PROGRESS NOTES
busPIRone (BUSPAR) 5 MG tablet Take 1 tablet by mouth 3 times daily as needed (for anxiety) Yes FROYLAN Wallace   acetaminophen (TYLENOL) 325 MG tablet Take 650 mg by mouth every 6 hours as needed for Pain Yes Historical Provider, MD       Social History     Tobacco Use    Smoking status: Former Smoker     Years: 15.00     Types: Cigarettes    Smokeless tobacco: Never Used   Substance Use Topics    Alcohol use: Not Currently    Drug use: Not Currently        Allergies   Allergen Reactions    Seasonal    ,   Past Medical History:   Diagnosis Date    Abnormal Pap smear of cervix     colpo 2017    Anxiety     Depression     resolve 2017   ,   Past Surgical History:   Procedure Laterality Date    APPENDECTOMY      TUBAL LIGATION  12/16/2020   ,   Social History     Tobacco Use    Smoking status: Former Smoker     Years: 15.00     Types: Cigarettes    Smokeless tobacco: Never Used   Substance Use Topics    Alcohol use: Not Currently    Drug use: Not Currently   ,   Family History   Problem Relation Age of Onset    Other Other         daughter-heart murmur    Diabetes Maternal Grandmother     Cataracts Maternal Grandmother     Heart Disease Mother         four open heart surgeries    Glaucoma Neg Hx    ,   Immunization History   Administered Date(s) Administered    Influenza, Quadv, IM, PF (6 mo and older Fluzone, Flulaval, Fluarix, and 3 yrs and older Afluria) 10/22/2019    MMR 11/07/2018    Tdap (Boostrix, Adacel) 08/28/2018, 08/20/2019   ,   Health Maintenance   Topic Date Due    Varicella vaccine (1 of 2 - 2-dose childhood series) 11/11/1993    Flu vaccine (1) 09/01/2020    Cervical cancer screen  03/04/2023    DTaP/Tdap/Td vaccine (3 - Td) 08/20/2029    HIV screen  Completed    Hepatitis A vaccine  Aged Out    Hepatitis B vaccine  Aged Out    Hib vaccine  Aged Out    Meningococcal (ACWY) vaccine  Aged Out    Pneumococcal 0-64 years Vaccine  Aged Out       PHYSICAL EXAMINATION:  [ INSTRUCTIONS:  \"[x]\" Indicates a positive item  \"[]\" Indicates a negative item  -- DELETE ALL ITEMS NOT EXAMINED]  Vital Signs: (As obtained by patient/caregiver or practitioner observation)    Blood pressure-  Heart rate-    Respiratory rate-  WNL  Temperature-  Pulse oximetry-     Constitutional: [x] Appears well-developed and well-nourished [x] No apparent distress      [] Abnormal-   Mental status  [x] Alert and awake  [x] Oriented to person/place/time [x]Able to follow commands      Eyes:  EOM    [x]  Normal  [] Abnormal-  Sclera  [x]  Normal  [] Abnormal -         Discharge [x]  None visible  [] Abnormal -    HENT:   [x] Normocephalic, atraumatic. [] Abnormal   [] Mouth/Throat: Mucous membranes are moist.     External Ears [x] Normal  [] Abnormal-     Neck: [x] No visualized mass     Pulmonary/Chest: [x] Respiratory effort normal.  [x] No visualized signs of difficulty breathing or respiratory distress        [] Abnormal-      Musculoskeletal:   [x] Normal gait with no signs of ataxia         [x] Normal range of motion of neck        [] Abnormal-       Neurological:        [x] No Facial Asymmetry (Cranial nerve 7 motor function) (limited exam to video visit)          [x] No gaze palsy        [] Abnormal-         Skin:        [] No significant exanthematous lesions or discoloration noted on facial skin         [] Abnormal-            Psychiatric:       [x] Normal Affect [x] No Hallucinations speech is normal responds appropriately when question. Mood and affect appropriate. Good eye contact. She is not anxious. She is not tearful. No evidence for suicide homicidal ideation. She is coherent. [] Abnormal-     Other pertinent observable physical exam findings-       Can take buspar   ASSESSMENT/PLAN:  1. Anxiety with depression    - DULoxetine (CYMBALTA) 30 MG extended release capsule; Take 1 capsule by mouth daily  Dispense: 30 capsule; Refill: 0    2.  Bilateral carpal tunnel syndrome    - Who cock-up nonmolde pre ots    We had her come in and have braces placed to see if this will help  Follow-up 2 weeks sooner if problems  Answered her questions  Home exercises discussed  NSAIDs as needed  Coping mechanisms        Procedures    Who cock-up nonmolde pre ots     Patient was prescribed a Procare Comfort Form Wrist brace. The bilateral wrist will require stabilization / immobilization from this semi-rigid / rigid orthosis to improve their function. The orthosis will assist in protecting the affected area, provide functional support and facilitate healing. The patient was educated and fit by a healthcare professional with expert knowledge and specialization in brace application while under the direct supervision of the treating physician. Verbal and written instructions for the use of and application of this item were provided. They were instructed to contact the office immediately should the brace result in increased pain, decreased sensation, increased swelling or worsening of the condition. Return in about 2 weeks (around 11/13/2020) for will come in for braces for arms for carpal tunnel. Vance Leung is a 31 y.o. female being evaluated by a Virtual Visit (video visit) encounter to address concerns as mentioned above. A caregiver was present when appropriate. Due to this being a TeleHealth encounter (During ERBVA-19 public health emergency), evaluation of the following organ systems was limited: Vitals/Constitutional/EENT/Resp/CV/GI//MS/Neuro/Skin/Heme-Lymph-Imm. Pursuant to the emergency declaration under the [address] [place] [address], [address] [place] [address] and the [employer] and [employer] Act, this Virtual Visit was conducted with patient's (and/or legal guardian's) consent, to reduce the patient's risk of exposure to COVID-19 and provide necessary medical care.   The patient (and/or legal guardian) has also been advised to contact this office for worsening conditions or problems, and seek emergency medical treatment and/or call 911 if deemed necessary. Patient identification was verified at the start of the visit: Yes    Total time spent on this encounter: 15 minutes    Services were provided through a video synchronous discussion virtually to substitute for in-person clinic visit. Patient and provider were located at their individual homes. --FROYLAN Allen on 11/15/2020 at 2:53 PM    An electronic signature was used to authenticate this note.

## 2020-11-04 ENCOUNTER — OFFICE VISIT (OUTPATIENT)
Dept: PRIMARY CARE CLINIC | Age: 30
End: 2020-11-04
Payer: MEDICAID

## 2020-11-04 ENCOUNTER — HOSPITAL ENCOUNTER (OUTPATIENT)
Age: 30
Setting detail: SPECIMEN
Discharge: HOME | End: 2020-11-04
Payer: MEDICAID

## 2020-11-04 VITALS
HEART RATE: 83 BPM | DIASTOLIC BLOOD PRESSURE: 70 MMHG | HEIGHT: 62 IN | RESPIRATION RATE: 16 BRPM | SYSTOLIC BLOOD PRESSURE: 106 MMHG | OXYGEN SATURATION: 97 % | WEIGHT: 142.8 LBS | TEMPERATURE: 97.4 F | BODY MASS INDEX: 26.28 KG/M2

## 2020-11-04 PROCEDURE — 99212 OFFICE O/P EST SF 10 MIN: CPT

## 2020-11-04 PROCEDURE — 1036F TOBACCO NON-USER: CPT | Performed by: NURSE PRACTITIONER

## 2020-11-04 PROCEDURE — U0003 INFECTIOUS AGENT DETECTION BY NUCLEIC ACID (DNA OR RNA); SEVERE ACUTE RESPIRATORY SYNDROME CORONAVIRUS 2 (SARS-COV-2) (CORONAVIRUS DISEASE [COVID-19]), AMPLIFIED PROBE TECHNIQUE, MAKING USE OF HIGH THROUGHPUT TECHNOLOGIES AS DESCRIBED BY CMS-2020-01-R: HCPCS

## 2020-11-04 PROCEDURE — G8484 FLU IMMUNIZE NO ADMIN: HCPCS | Performed by: NURSE PRACTITIONER

## 2020-11-04 PROCEDURE — G8419 CALC BMI OUT NRM PARAM NOF/U: HCPCS | Performed by: NURSE PRACTITIONER

## 2020-11-04 PROCEDURE — 99213 OFFICE O/P EST LOW 20 MIN: CPT | Performed by: NURSE PRACTITIONER

## 2020-11-04 PROCEDURE — G8427 DOCREV CUR MEDS BY ELIG CLIN: HCPCS | Performed by: NURSE PRACTITIONER

## 2020-11-04 ASSESSMENT — ENCOUNTER SYMPTOMS
COUGH: 0
VOMITING: 0
RESPIRATORY NEGATIVE: 1
ABDOMINAL PAIN: 0
SINUS PRESSURE: 0
NAUSEA: 0
RHINORRHEA: 0
PHOTOPHOBIA: 0
SORE THROAT: 1
DIARRHEA: 1

## 2020-11-04 ASSESSMENT — PATIENT HEALTH QUESTIONNAIRE - PHQ9: DEPRESSION UNABLE TO ASSESS: PT REFUSES

## 2020-11-04 NOTE — LETTER
[facility]  [address] [facility] [place] [address]  Phone: [phone]  Fax: Behzad Cage 74., APRN - CNP        November 4, 2020     Patient: Dean Donato   YOB: 1990   Date of Visit: 11/4/2020       To Whom it May Concern:    Maryuri Cunningham was seen in my clinic on 11/4/2020. May return to work with negative Covid-19 test result and improved symptoms. Test result in 3-7 days. If you have any questions or concerns, please don't hesitate to call.     Sincerely,         Andres Brown, STEPHEN - CNP

## 2020-11-04 NOTE — PATIENT INSTRUCTIONS
Patient Education        Viral Infections: Care Instructions  Your Care Instructions     You don't feel well, but it's not clear what's causing it. You may have a viral infection. Viruses cause many illnesses, such as the common cold, influenza, fever, rashes, and the diarrhea, nausea, and vomiting that are often called \"stomach flu. \" You may wonder if antibiotic medicines could make you feel better. But antibiotics only treat infections caused by bacteria. They don't work on viruses. The good news is that viral infections usually aren't serious. Most will go away in a few days without medical treatment. In the meantime, there are a few things you can do to make yourself more comfortable. Follow-up care is a key part of your treatment and safety. Be sure to make and go to all appointments, and call your doctor if you are having problems. It's also a good idea to know your test results and keep a list of the medicines you take. How can you care for yourself at home? · Get plenty of rest if you feel tired. · Take an over-the-counter pain medicine if needed, such as acetaminophen (Tylenol), ibuprofen (Advil, Motrin), or naproxen (Aleve). Read and follow all instructions on the label. · Be careful when taking over-the-counter cold or flu medicines and Tylenol at the same time. Many of these medicines have acetaminophen, which is Tylenol. Read the labels to make sure that you are not taking more than the recommended dose. Too much acetaminophen (Tylenol) can be harmful. · Drink plenty of fluids, enough so that your urine is light yellow or clear like water. If you have kidney, heart, or liver disease and have to limit fluids, talk with your doctor before you increase the amount of fluids you drink. · Stay home from work, school, and other public places while you have a fever. When should you call for help? Call 911 anytime you think you may need emergency care.  For example, call if:    · You have severe trouble breathing.     · You passed out (lost consciousness). Call your doctor now or seek immediate medical care if:    · You seem to be getting much sicker.     · You have a new or higher fever.     · You have blood in your stools.     · You have new belly pain, or your pain gets worse.     · You have a new rash. Watch closely for changes in your health, and be sure to contact your doctor if:    · You start to get better and then get worse.     · You do not get better as expected. Where can you learn more? Go to https://Magic Tech Networkpepiceweb.Adspert | [employer]. org and sign in to your PayLease account. Enter Q154 in the Comply365 box to learn more about \"Viral Infections: Care Instructions. \"     If you do not have an account, please click on the \"Sign Up Now\" link. Current as of: February 11, 2020               Content Version: 12.6  © 0580-8457 [employer]. Care instructions adapted under license by [facility] ([facility]). If you have questions about a medical condition or this instruction, always ask your healthcare professional. [place] [address] any warranty or liability for your use of this information. Patient Education        Fatigue: Care Instructions  Your Care Instructions     Fatigue is a feeling of tiredness, exhaustion, or lack of energy. You may feel fatigue because of too much or not enough activity. It can also come from stress, lack of sleep, boredom, and poor diet. Many medical problems, such as viral infections, can cause fatigue. Emotional problems, especially depression, are often the cause of fatigue. Fatigue is most often a symptom of another problem. Treatment for fatigue depends on the cause. For example, if you have fatigue because you have a certain health problem, treating this problem also treats your fatigue. If depression or anxiety is the cause, treatment may help. Follow-up care is a key part of your treatment and safety.  Be sure to make and go to all appointments, and call your doctor if you are having problems. It's also a good idea to know your test results and keep a list of the medicines you take. How can you care for yourself at home? · Get regular exercise. But don't overdo it. Go back and forth between rest and exercise. · Get plenty of rest.  · Eat a healthy diet. Do not skip meals, especially breakfast.  · Reduce your use of caffeine, tobacco, and alcohol. Caffeine is most often found in coffee, tea, cola drinks, and chocolate. · Limit medicines that can cause fatigue. This includes tranquilizers and cold and allergy medicines. When should you call for help? Watch closely for changes in your health, and be sure to contact your doctor if:    · You have new symptoms such as fever or a rash.     · Your fatigue gets worse.     · You have been feeling down, depressed, or hopeless. Or you may have lost interest in things that you usually enjoy.     · You are not getting better as expected. Where can you learn more? Go to https://Voyager [employer].Endovention. org and sign in to your Conyac account. Enter B650 in the IAT-Auto box to learn more about \"Fatigue: Care Instructions. \"     If you do not have an account, please click on the \"Sign Up Now\" link. Current as of: June 26, 2019               Content Version: 12.6  © 9811-2074 [employer]. Care instructions adapted under license by [facility] ([facility]). If you have questions about a medical condition or this instruction, always ask your healthcare professional. [place] [address] any warranty or liability for your use of this information. Patient Education        Learning About Coronavirus [phone])  Coronavirus [phone]): Overview  What is coronavirus (DOGGH-68)? The coronavirus disease (COVID-19) is caused by a virus. It is an illness that was first found in December 2019. It has since spread worldwide.   The virus can cause fever, cough, microwave. And don't forget countertops, tabletops, bathrooms, and computer keyboards. · Wear a cloth face cover if you have to go to public areas. If you know or suspect that you have COVID-19:  · Stay home. Don't go to school, work, or public areas. And don't use public transportation, ride-shares, or taxis unless you have no choice. · Leave your home only if you need to get medical care or testing. But call the doctor's office first so they know you're coming. And wear a face cover. · Limit contact with people in your home. If possible, stay in a separate bedroom and use a separate bathroom. · Wear a face cover whenever you're around other people. It can help stop the spread of the virus when you cough or sneeze. · Clean and disinfect your home every day. Use household  and disinfectant wipes or sprays. Take special care to clean things that you grab with your hands. · Self-isolate until it's safe to be around others again. ? If you have symptoms, it's safe when you haven't had a fever for 3 days and your symptoms have improved and it's been at least 10 days since your symptoms started. ? If you were exposed to the virus but don't have symptoms, it's safe to be around others 14 days after exposure. ? Talk to your doctor about whether you also need testing, especially if you have a weakened immune system. When to call for help  Call 911 anytime you think you may need emergency care. For example, call if:  · You have severe trouble breathing. (You can't talk at all.)  · You have constant chest pain or pressure. · You are severely dizzy or lightheaded. · You are confused or can't think clearly. · Your face and lips have a blue color. · You passed out (lost consciousness) or are very hard to wake up. Call your doctor now if you develop symptoms such as:  · Shortness of breath. · Fever. · Cough. If you need to get care, call ahead to the doctor's office for instructions before you go.  Make sure you wear a face cover to prevent exposing other people to the virus. Where can you get the latest information? The following health organizations are tracking and studying this virus. Their websites contain the most up-to-date information. [place] also learn what to do if you think you may have been exposed to the virus. · U.S. Centers for Disease Control and Prevention (CDC): The CDC provides updated news about the disease and travel advice. The website also tells you how to prevent the spread of infection. www.cdc.gov  · World Health Organization [facility]): WHO offers information about the virus outbreaks. WHO also has travel advice. www.who.int  Current as of: July 10, 2020               Content Version: 12.6   [employer]. Care instructions adapted under license by [facility] ([facility]). If you have questions about a medical condition or this instruction, always ask your healthcare professional. [place] [address] any warranty or liability for your use of this information. Patient Education        New Parent With COVID-19: Care Instructions  Overview     COVID-19 is caused by a virus. It can cause a fever, a cough, shortness of breath, and other symptoms. COVID-19 mainly spreads person-to-person through droplets from coughing and sneezing. The virus also can spread when people are in close contact with someone who is infected. Most people have mild symptoms and can take care of themselves at home. Caring for a baby while you are sick with COVID-19 can be a challenge. You'll want to take care of yourself and keep your baby safe from the virus. Experts believe that passing the virus to a baby during pregnancy is unlikely. But after birth, a baby can get the virus through person-to-person contact--just like anyone else.  That's why if the mother is sick, a  may be kept separate from the parents who have COVID-23 while they are in the hospital.  So far, the virus hasn't been found in breast milk. But it's not known if the virus can be spread through breastfeeding. Talk with your doctor or midwife about breastfeeding while you are sick. You may decide to feed your baby at your breast. Or you may decide to pump your breast milk and have someone who is healthy give it to your baby. Either way, it's important to work with your care team. There are things you can do to keep your baby safer. Follow-up care is a key part of your treatment and safety. Be sure to make and go to all appointments, and call your doctor if you are having problems. It's also a good idea to know your test results and keep a list of the medicines you take. How can you care for yourself and your baby at home? · Stay home. Don't go to school, work, or public areas. And don't use public transportation, ride-shares, or taxis unless you have no choice. Leave your home only if you need to get medical care. But call the doctor's office first so they know you're coming. · Wear a mask or cloth face cover when you are around other people, including your baby. It can help stop the spread of the virus. Children under 3years of age should not wear a face cover. · Limit contact with people in your home, including your baby. If possible, stay in a separate bedroom and use a separate bathroom. · Avoid contact with pets and other animals. If you can, have a friend or family member care for them while you're sick. · If you are able, move around as much as possible. Getting out of bed can help prevent blood clots in your legs. · Cover your mouth and nose with a tissue when you cough or sneeze. Then throw the tissue in the trash right away. · Wash your hands often, especially after you cough or sneeze. Use soap and water, and scrub for at least 20 seconds. If soap and water aren't available, use an alcohol-based hand . · Don't share personal household items.  These include bedding, towels, cups and glasses, and eating utensils. · Clean and disinfect your home every day. Use household  or disinfectant wipes or sprays. Take special care to clean things that you grab with your hands. These include doorknobs, remote controls, phones, and handles on your refrigerator and microwave. And don't forget countertops, tabletops, bathrooms, and computer keyboards. · Take acetaminophen (Tylenol) to relieve your fever and body aches. Read and follow all instructions on the label. If you're breastfeeding  · Take extra care to avoid passing the infection to your baby. ? Wash your hands well before you touch your baby. ? Wear a mask or cloth face cover. Wear it anytime you hold your baby. · Take care if you pump breast milk. ? Wash your hands well before you touch the pump or bottle. ? Clean the pump well when you're done. Read the instructions that came with the pump, or ask for help from your doctor or midwife. ? If you can, have someone who isn't sick give your baby the bottle. When should you call for help? If you are ill   Call 911 anytime you think you may need emergency care. For example, call if you have life-threatening symptoms, such as:    · You have severe trouble breathing. (You can't talk at all.)     · You have constant chest pain or pressure.     · You are severely dizzy or lightheaded.     · You are confused or can't think clearly.     · Your face and lips have a blue color.     · You passed out (lost consciousness) or are very hard to wake up. Call your doctor now or seek immediate medical care if:    · You have moderate trouble breathing. (You can't speak a full sentence.)     · You are coughing up blood (more than about 1 teaspoon).     · You have signs of low blood pressure. These include feeling lightheaded; being too weak to stand; and having cold, pale, clammy skin.    Watch closely for changes in your health, and be sure to contact your doctor if:    · Your symptoms get worse.     · You are not getting better as expected. Call before you go to the doctor's office. Follow their instructions. And wear a cloth face cover. If you are worried that your baby is ill, call your doctor now or seek immediate medical care. Current as of: July 10, 2020               Content Version: 12.6  © 6061-3818 [employer]. Care instructions adapted under license by [facility] ([facility]). If you have questions about a medical condition or this instruction, always ask your healthcare professional. [place] [address] any warranty or liability for your use of this information.

## 2020-11-04 NOTE — PROGRESS NOTES
[facility]             [address], [address]                        Telephone [phone]             Fax [phone]     [address] [place] [address],[address]  1990  GNZ:[id number]   Date of visit:  11/4/2020    Subjective: [facility],[address] is a 34 y.o.  female who presents to [facility] today (11/4/2020) for evaluation of:    Chief Complaint   Patient presents with    Fever     Chills, fatigue, body aches, diarrhea,headache(started 3days ago) Several postive exposure        Headache    This is a new problem. The current episode started in the past 7 days (11/01/20). The problem occurs constantly. The problem has been unchanged. The pain is located in the frontal region. The pain does not radiate. The quality of the pain is described as aching. The pain is mild. Associated symptoms include a fever (this morning 100.1) and a sore throat (for 1 day, now resolved). Pertinent negatives include no abdominal pain, coughing, nausea, phonophobia, photophobia, rhinorrhea, sinus pressure or vomiting. Associated symptoms comments: Chills; postnasal drainage. Nothing aggravates the symptoms. She has tried nothing for the symptoms. The treatment provided no relief. Several persons at work who are positive for Covid-19, no direct contact. She has the following problem list:  Patient Active Problem List   Diagnosis    Myopia of both eyes with astigmatism        Current medications are:  Current Outpatient Medications   Medication Sig Dispense Refill    DULoxetine (CYMBALTA) 30 MG extended release capsule Take 1 capsule by mouth daily 30 capsule 0    methylPREDNISolone (MEDROL, VIRIDIANA,) 4 MG tablet Take by mouth.  1 kit 0    busPIRone (BUSPAR) 5 MG tablet Take 1 tablet by mouth 3 times daily as needed (for anxiety) 30 tablet 5    DULoxetine (CYMBALTA) 20 MG extended release capsule TAKE 1 CAPSULE BY MOUTH ONE TIME A DAY 30 capsule 5    acetaminophen (TYLENOL) 325 MG tablet Take 650 mg by mouth every 6 hours as needed for Pain       No current facility-administered medications for this visit. She is allergic to seasonal..    She  reports that she has quit smoking. Her smoking use included cigarettes. She quit after 15.00 years of use. She has never used smokeless tobacco.      Objective:    Vitals:    11/04/20 1641   BP: 106/70   Pulse: 83   Resp: 16   Temp: 97.4 °F (36.3 °C)   TempSrc: Tympanic   SpO2: 97%   Weight: 142 lb 12.8 oz (64.8 kg)   Height: 5' 2\" (1.575 m)     Body mass index is 26.12 kg/m². Review of Systems   Constitutional: Positive for appetite change, chills, fatigue and fever (this morning 100.1). HENT: Positive for postnasal drip and sore throat (for 1 day, now resolved). Negative for congestion, rhinorrhea and sinus pressure. Eyes: Negative for photophobia. Respiratory: Negative. Negative for cough. Cardiovascular: Negative. Gastrointestinal: Positive for diarrhea. Negative for abdominal pain, nausea and vomiting. Neurological: Positive for headaches. Physical Exam  Vitals signs and nursing note reviewed. Constitutional:       Appearance: She is well-developed. HENT:      Head: Normocephalic. Jaw: There is normal jaw occlusion. Right Ear: Tympanic membrane, ear canal and external ear normal.      Left Ear: Tympanic membrane, ear canal and external ear normal.      Nose:      Right Turbinates: Swollen (erythema). Left Turbinates: Swollen (erythema). Right Sinus: No maxillary sinus tenderness or frontal sinus tenderness. Left Sinus: No maxillary sinus tenderness or frontal sinus tenderness. Mouth/Throat:      Lips: Pink. Mouth: Mucous membranes are moist.      Pharynx: Oropharynx is clear. Uvula midline. Eyes:      Pupils: Pupils are equal, round, and reactive to light. Neck:      Musculoskeletal: Normal range of motion and neck supple. Cardiovascular:      Rate and Rhythm: Normal rate and regular rhythm. Heart sounds: Normal heart sounds. Pulmonary:      Effort: Pulmonary effort is normal.      Breath sounds: Normal breath sounds and air entry. Lymphadenopathy:      Cervical: No cervical adenopathy. Skin:     General: Skin is warm and dry. Neurological:      Mental Status: She is alert and oriented to person, place, and time. Psychiatric:         Behavior: Behavior normal.         Thought Content: Thought content normal.       Assessment and Plan:    No results found for this visit on 11/04/20. Diagnosis Orders   1. Acute nonintractable headache, unspecified headache type     2. Fatigue, unspecified type  Covid-19 Ambulatory   3. Postnasal discharge  Covid-19 Ambulatory   4. Person under investigation for COVID-19  Covid-19 Ambulatory     Self quarantine until negative Covid-19 test result received and symptoms improving. We will call with Covid-19 test results. Take Tylenol or ibuprofen as needed for pain/fever. Increase fluid intake and rest. Follow up with PCP or return to [facility] if symptoms persist or worsen. The use, risks, benefits, and side effects of prescribed or recommended medications were discussed. All questions were answered and the patient/caregiver voiced understanding. No orders of the defined types were placed in this encounter.         Electronically signed by STEPHEN Freedman CNP on 11/4/20 at 4:55 PM EST

## 2020-11-07 LAB — SARS-COV-2, NAA: NOT DETECTED

## 2020-11-13 ENCOUNTER — OFFICE VISIT (OUTPATIENT)
Dept: FAMILY MEDICINE CLINIC | Age: 30
End: 2020-11-13
Payer: MEDICAID

## 2020-11-13 VITALS
HEART RATE: 85 BPM | DIASTOLIC BLOOD PRESSURE: 60 MMHG | OXYGEN SATURATION: 99 % | SYSTOLIC BLOOD PRESSURE: 110 MMHG | HEIGHT: 61 IN | BODY MASS INDEX: 26.24 KG/M2 | WEIGHT: 139 LBS

## 2020-11-13 PROCEDURE — G8427 DOCREV CUR MEDS BY ELIG CLIN: HCPCS | Performed by: PHYSICIAN ASSISTANT

## 2020-11-13 PROCEDURE — G8419 CALC BMI OUT NRM PARAM NOF/U: HCPCS | Performed by: PHYSICIAN ASSISTANT

## 2020-11-13 PROCEDURE — 99213 OFFICE O/P EST LOW 20 MIN: CPT | Performed by: PHYSICIAN ASSISTANT

## 2020-11-13 PROCEDURE — G8484 FLU IMMUNIZE NO ADMIN: HCPCS | Performed by: PHYSICIAN ASSISTANT

## 2020-11-13 PROCEDURE — 95886 MUSC TEST DONE W/N TEST COMP: CPT | Performed by: PHYSICIAN ASSISTANT

## 2020-11-13 PROCEDURE — 99211 OFF/OP EST MAY X REQ PHY/QHP: CPT | Performed by: PHYSICIAN ASSISTANT

## 2020-11-13 PROCEDURE — 1036F TOBACCO NON-USER: CPT | Performed by: PHYSICIAN ASSISTANT

## 2020-11-13 ASSESSMENT — ENCOUNTER SYMPTOMS
WHEEZING: 0
SHORTNESS OF BREATH: 0
DIARRHEA: 0
COUGH: 0
NAUSEA: 0
VOMITING: 0

## 2020-11-13 NOTE — PROGRESS NOTES
[facility]    Subjective:      Patient ID: Iris Vaughan is a 31 y.o. y.o. female. Patient is seen due to follow up on possible carpal tunnel. Did not like the braces at all and wonders if it made sx pain worse. Not using these. The arms still went numb. The steroids did help sx. Is waking up and they are still going numb. Has pain in right trapezius for years. Almost dropped her one year old. Was cutting a hamburger yesterday and got a shock in hand. The right is worse than left. Past Medical History:   Diagnosis Date    Abnormal Pap smear of cervix     colpo 2017    Anxiety     Depression     resolve 2017       Past Surgical History:   Procedure Laterality Date    APPENDECTOMY      TUBAL LIGATION  12/16/2020       Family History   Problem Relation Age of Onset    Other Other         daughter-heart murmur    Diabetes Maternal Grandmother     Cataracts Maternal Grandmother     Heart Disease Mother         four open heart surgeries    Glaucoma Neg Hx        Allergies   Allergen Reactions    Seasonal        Current Outpatient Medications   Medication Sig Dispense Refill    DULoxetine (CYMBALTA) 30 MG extended release capsule Take 1 capsule by mouth daily 30 capsule 0    busPIRone (BUSPAR) 5 MG tablet Take 1 tablet by mouth 3 times daily as needed (for anxiety) 30 tablet 5    acetaminophen (TYLENOL) 325 MG tablet Take 650 mg by mouth every 6 hours as needed for Pain       No current facility-administered medications for this visit. Review of Systems   Constitutional: Positive for fatigue. Negative for appetite change, chills and fever. Respiratory: Negative for cough, shortness of breath and wheezing. Cardiovascular: Negative for chest pain and palpitations. Gastrointestinal: Negative for diarrhea, nausea and vomiting. Genitourinary: Negative for difficulty urinating. Musculoskeletal: Negative for myalgias. Skin: Negative for rash. Neurological: Negative for light-headedness, numbness and headaches. Was tested for covid it was negative. Did have a HA. Did have a fever and diarrhea for one week. Her supervisor had covid and was working. Psychiatric/Behavioral: Negative for sleep disturbance. The patient is not nervous/anxious. Mood is better not crying as much. Is tired  Feels better. Not flying off handle like she was. Not yelling at  like she was. The kids are tough to deal with. Objective:      /60   Pulse 85   Ht 5' 1\" (1.549 m)   Wt 139 lb (63 kg)   SpO2 99%   BMI 26.26 kg/m²     Physical Exam  Vitals signs and nursing note reviewed. Constitutional:       General: She is not in acute distress. Appearance: Normal appearance. She is well-developed. She is not ill-appearing. HENT:      Head: Normocephalic and atraumatic. Nose: Nose normal. No rhinorrhea. Eyes:      General: No scleral icterus. Neck:      Musculoskeletal: No neck rigidity or muscular tenderness. Cardiovascular:      Pulses: Normal pulses. Pulmonary:      Effort: Pulmonary effort is normal.   Musculoskeletal: Normal range of motion. General: Tenderness present. No swelling, deformity or signs of injury. Right lower leg: No edema. Left lower leg: No edema. Lymphadenopathy:      Cervical: No cervical adenopathy. Skin:     General: Skin is warm and dry. Findings: No erythema or rash. Neurological:      General: No focal deficit present. Mental Status: She is alert and oriented to person, place, and time. Sensory: No sensory deficit. Motor: No weakness. Gait: Gait normal.      Comments: Negative Tinel's and Phalen's. Slight pain with palpation over the wrists. Normal range of motion of the wrists forearms bilaterally. No swelling redness or warmth.    Psychiatric:         Mood and Affect: Mood normal.         Behavior: Behavior normal.         Thought Content: Thought content normal.         Judgment: Judgment normal.           Assessment & Plan:     1.  Numbness and tingling of upper extremity      Ordered nerve conduction studies  She will be notified of all results  Further treatment based on results  Answered her questions  NSAIDs as needed  Home exercises  Follow-up 1 month sooner if problems    FROYLAN Huber  12/1/2020 11:46 AM EST    (Pleasenote that portions of this note were completed with a voice recognition program.Efforts were made to edit the dictations but occasionally words are mis-transcribed.)

## 2020-11-19 ENCOUNTER — HOSPITAL ENCOUNTER (OUTPATIENT)
Dept: NEUROLOGY | Age: 30
Discharge: HOME | End: 2020-11-19
Payer: MEDICAID

## 2020-11-19 PROCEDURE — 95886 MUSC TEST DONE W/N TEST COMP: CPT

## 2020-11-19 PROCEDURE — 95912 NRV CNDJ TEST 11-12 STUDIES: CPT

## 2020-11-19 NOTE — PROGRESS NOTES
EMG/NCS Bilateral    upper Completed    PCP: FROYLAN Knox    Ordering: FROYLAN Moran    Interpreting Physician: Tricia Hatfield MD.    Technician: Shawn Chun RN

## 2020-11-23 NOTE — PROCEDURES
Reggie Guthrie is a 31 y.o. female patient. 1. Numbness and tingling of upper extremity      Past Medical History:   Diagnosis Date    Abnormal Pap smear of cervix     colpo 2017    Anxiety     Depression     resolve 2017     currently breastfeeding. Procedures   Electromyography/ Nerve Conduction Study  (EMG/NCV)    Patient Name: Reggie Guthrie   MRN: [id number]  Date of Procedure: 11/23/2020 . Procedure:  Bilateral Upper Extremity EMG /NCV    Chief Complaint[de-identified]    Reggie Guthrie is a 31 y.o. female  Referred  By FROYLAN Gonzalez   for electrodiagnostic medicine consultation. Patient complains of No chief complaint on file. . Patient has pain and numbness and lack of feeling in B hands  Digits 3,4  mostly  This been going on for about 2 months without any specific injury. Patient has abnormal  sensation in the  B hands . Pain radiates proximally  To shoulders , with minimal  Neck  Pain     There is not history of fractures    Patient does not have Diabetes Mellitus. Patient little neck pain  neck pain, left arm pain nor current pain or weakness in legs. There are not new XRays  Imaging:  [unfilled]      Labs: .    Pain Scale:  Pain and numbness reported as 7 out of 10. Her present pain began few months  ao. Pain has not changed since onset. Pain is associated with:numbness     Patient does report numbness. Patient does report tingling. There is mild ?  associated weakness. Difficulty controlling bowels: No.  Difficulty controlling bladder: No.  Electrical shock sensation in her legs: No.  Difficulty with balance while standing or walking: No.    Patient reports that nothin help to alleviate the pain. The following changes pain for the worse: - . The following treatment has been tried:  Physical therapy: No.   Chiropractic treatment: No.  Epidural steroid injection/block: no   Prescription medications: No.   Over-the-counter medications: Yes.       Past Medical History:  Past Medical History:   Diagnosis Date    Abnormal Pap smear of cervix     colpo 2017    Anxiety     Depression     resolve 2017        Past Surgical History  Past Surgical History:   Procedure Laterality Date    APPENDECTOMY      TUBAL LIGATION  12/16/2020         Family Medical History :  Family History   Problem Relation Age of Onset    Other Other         daughter-heart murmur    Diabetes Maternal Grandmother     Cataracts Maternal Grandmother     Heart Disease Mother         four open heart surgeries    Glaucoma Neg Hx          Social History   Social History     Socioeconomic History    Marital status:      Spouse name: Not on file    Number of children: Not on file    Years of education: Not on file    Highest education level: Not on file   Occupational History    Not on file   Social Needs    Financial resource strain: Not on file    Food insecurity     Worry: Not on file     Inability: Not on file    Transportation needs     Medical: Not on file     Non-medical: Not on file   Tobacco Use    Smoking status: Former Smoker     Years: 15.00     Types: Cigarettes    Smokeless tobacco: Never Used   Substance and Sexual Activity    Alcohol use: Not Currently    Drug use: Not Currently    Sexual activity: Yes     Partners: Male     Birth control/protection: Surgical     Comment: Tubal 2019   Lifestyle    Physical activity     Days per week: Not on file     Minutes per session: Not on file    Stress: Not on file   Relationships    Social connections     Talks on phone: Not on file     Gets together: Not on file     Attends Jainism service: Not on file     Active member of club or organization: Not on file     Attends meetings of clubs or organizations: Not on file     Relationship status: Not on file    Intimate partner violence     Fear of current or ex partner: Not on file     Emotionally abused: Not on file     Physically abused: Not on file     Forced sexual activity: Not on file   Other Topics Concern    Not on file   Social History Narrative    ** Merged History Encounter **         ** Merged History Encounter **              Review of Systems  Allergies   Allergen Reactions    Seasonal      Current Outpatient Medications on File Prior to Encounter   Medication Sig Dispense Refill    DULoxetine (CYMBALTA) 30 MG extended release capsule Take 1 capsule by mouth daily 30 capsule 0    busPIRone (BUSPAR) 5 MG tablet Take 1 tablet by mouth 3 times daily as needed (for anxiety) 30 tablet 5    acetaminophen (TYLENOL) 325 MG tablet Take 650 mg by mouth every 6 hours as needed for Pain       No current facility-administered medications on file prior to encounter. Constitutional- no chills fever fatigue  HENT: no congestion   Eyes: no discharge itching  Respiratory: Negative for choking chest tightness, short of breath  Gastrointestinal: no nausea vomiting  Abdominal pain  Musculoskeletal: negative for arthralgias back pain currently  Behavioral: mild anxiety    Physical Exam:   General Appearance:  alert, well appearing, and in no acute distress  Mental status: oriented to person, place, and time with normal affect  Head:  normocephalic, atraumatic. Eye: no icterus, redness, pupils equal and reactive, extraocular eye movements intact, conjunctiva clear  Ear: normal external ear, no discharge, hearing intact  Nose:  no drainage noted  Mouth: mucous membranes moist  Neck: supple, no carotid bruits, thyroid not palpable  Lungs: Bilateral equal air entry, clear to ausculation, no wheezing, rales or rhonchi, normal effort  Cardiovascular: normal rate, regular rhythm, no murmur, gallop, rub.   Abdomen: Soft, nontender, nondistended, normal bowel sounds, no hepatomegaly or splenomegaly  Neurologic: normal muscle tone and bulk, sensory deficits - + TINELS AND MEDIAN NERVE COMPRESSIONb , normal speech, cranial nerves II through XII grossly intact  Musculoskeletal:  No bony deformities  Skin: No gross lesions, rashes, bruising or bleeding on exposed skin area  Extremities:  peripheral pulses palpable, no pedal edema or calf pain with palpation  Psych: normal affect     EMG/NCV FINDINGS:   1) B Carpal Tunnel Syndrome to mild degree affecting  Motor and sensory    Fibers to  Mild  Degree                                        DIAGNOSES   1 B Carpal Tunnel  Syndrome                        2) B hand Pain                        3) Anxiety    TABLES see EMG NCV tables       Findings of Nerve Conductions: Please see NCV table  R Median Sensory 2  Digit  Greater  Than 0.5  Distal latency than  R ulnar sensory   -R median motor   Studies  Distal latency (DL) greater than  1 ms. Prolonged  Compared to   Ulnar Motor Studies Distal Latency  - Radial and ulnar nerve  Conduction studies normal   Needle electromyography    Showed no evidence of  B cervical radiculopathy  Brachial plexopathy     And rest of  Nerve Conduction Velocities  Did not show  Ulnar neuropathy  Radial  neuropathy  Nor  Other mononeuropathy nor peripheral neuropathies       . R carpal tunnel Syndrome affecting  Sensory and motor  Fibers to mild  Degree    L carpal tunnel  Syndrome affecting  Sensory and motor fibers to mild degree. No Electromyography nor Nerve Conduction Velocities  Evidence of  Cervical radiculopathy,  Brachial plexopathy,  Peripheral neuropathies  Nor   Other mononeuropathyies as  Performed in this study    PLAN:    . Would not recommend  Carpal Tunnel Release surgery at this   Time as mild case though some Orthopedic surgeons would consider  As patient  Is  Very symptomatic with hand pain and numbness and  Arm referral pattern.     I    If no Ortho  Consult, Could perform  B Carpal tunnel  Injections    Which can give   Good relief along with  exercises hands and positioning measures  In about 30 percent  Of patients with mild Carpal Tunnel Syndrome    Conservative measures include:  Adjust B carpal   Tunnel braces   So metal stays do not   Make  Distal numbness worse  Or  New splints    Showed   Hand 'exercises' for carpal tunnel      Or  Can see OT  For  Home exercise/splint use  2 sessions     Aleve  Does  Help  Can  Take 1-2 po BID  if  No  GI  Side effects         Electromyography/Nerve Conduction Studies in 6 months     .           Electronically signed by Jas Mueller MD on 11/23/2020 at 12:30 PM     Jas Mueller MD  11/23/2020

## 2020-12-01 ENCOUNTER — TELEPHONE (OUTPATIENT)
Dept: FAMILY MEDICINE CLINIC | Age: 30
End: 2020-12-01

## 2020-12-01 NOTE — TELEPHONE ENCOUNTER
She has mild carpal tunnel I would have her see OT and see if it helps  Agree with Dr. Doug Espinal recommendations

## 2020-12-01 NOTE — TELEPHONE ENCOUNTER
She did have it done on 11/19/20 there are notes in note section from Dr Tami Yuen. I had to put in a new order for the EMG that is why it may look like it was cancelled on your end.

## 2020-12-01 NOTE — TELEPHONE ENCOUNTER
----- Message from Serafin deepthi [place] sent at 12/1/2020 12:33 PM EST -----  Can you check with patient. I ordered nerve conduction studies on upper extremities. I see it is cancelled? Does she plan to reschedule?

## 2020-12-11 ENCOUNTER — VIRTUAL VISIT (OUTPATIENT)
Dept: FAMILY MEDICINE CLINIC | Age: 30
End: 2020-12-11
Payer: MEDICAID

## 2020-12-11 PROCEDURE — 99211 OFF/OP EST MAY X REQ PHY/QHP: CPT

## 2020-12-11 PROCEDURE — G8427 DOCREV CUR MEDS BY ELIG CLIN: HCPCS | Performed by: PHYSICIAN ASSISTANT

## 2020-12-11 PROCEDURE — 99214 OFFICE O/P EST MOD 30 MIN: CPT | Performed by: PHYSICIAN ASSISTANT

## 2020-12-11 RX ORDER — DULOXETIN HYDROCHLORIDE 30 MG/1
30 CAPSULE, DELAYED RELEASE ORAL DAILY
Qty: 90 CAPSULE | Refills: 1 | Status: SHIPPED | OUTPATIENT
Start: 2020-12-11 | End: 2022-02-02 | Stop reason: ALTCHOICE

## 2020-12-11 RX ORDER — BUSPIRONE HYDROCHLORIDE 5 MG/1
5 TABLET ORAL 3 TIMES DAILY PRN
Qty: 90 TABLET | Refills: 1 | Status: SHIPPED | OUTPATIENT
Start: 2020-12-11 | End: 2021-01-10

## 2020-12-11 RX ORDER — GABAPENTIN 100 MG/1
100 CAPSULE ORAL 3 TIMES DAILY
Qty: 90 CAPSULE | Refills: 1 | Status: SHIPPED | OUTPATIENT
Start: 2020-12-11 | End: 2022-02-02 | Stop reason: ALTCHOICE

## 2020-12-11 ASSESSMENT — ENCOUNTER SYMPTOMS
WHEEZING: 0
SHORTNESS OF BREATH: 0
NAUSEA: 0
DIARRHEA: 0
COUGH: 0
VOMITING: 0

## 2020-12-11 ASSESSMENT — PATIENT HEALTH QUESTIONNAIRE - PHQ9
SUM OF ALL RESPONSES TO PHQ QUESTIONS 1-9: 0
SUM OF ALL RESPONSES TO PHQ9 QUESTIONS 1 & 2: 0
SUM OF ALL RESPONSES TO PHQ QUESTIONS 1-9: 0
SUM OF ALL RESPONSES TO PHQ QUESTIONS 1-9: 0
2. FEELING DOWN, DEPRESSED OR HOPELESS: 0
1. LITTLE INTEREST OR PLEASURE IN DOING THINGS: 0

## 2020-12-11 NOTE — PROGRESS NOTES
2020    TELEHEALTH EVALUATION -- Audio/Visual (During MPNQF-53 public health emergency)    HPI:    Joey Dowling (:  1990) has requested an audio/video evaluation for the following concern(s):    Patient is seen following up on carpal tunnel and mood. Her arms are really bad nothing is helping. Has less strength in arms. The right arm is the worst.  Can tolerate the left arm. Has stabbing pain in the arms more so the right arm. Afraid to lift anything. Is in so much pain. Had a mental break down at work yesterday. Is more emotional.  She does not like her job. Is bullied at work. Is afraid of this coworker. Review of Systems   Constitutional: Negative for appetite change, fatigue and fever. Respiratory: Negative for cough, shortness of breath and wheezing. Cardiovascular: Negative for chest pain and palpitations. Gastrointestinal: Negative for diarrhea, nausea and vomiting. Genitourinary: Negative for difficulty urinating. Musculoskeletal: Positive for myalgias. Negative for neck pain and neck stiffness. Neck muscles are tight    Skin: Negative for rash. Neurological: Positive for weakness and numbness. Negative for light-headedness and headaches. Psychiatric/Behavioral: Positive for sleep disturbance. The patient is not nervous/anxious. Prior to Visit Medications    Medication Sig Taking? Authorizing Provider   gabapentin (NEURONTIN) 100 MG capsule Take 1 capsule by mouth 3 times daily for 30 days.  Yes FROYLAN Berry   DULoxetine (CYMBALTA) 30 MG extended release capsule Take 1 capsule by mouth daily Yes FROYLAN Berry   busPIRone (BUSPAR) 5 MG tablet Take 1 tablet by mouth 3 times daily as needed (anxiety) Yes FROYLAN Berry   busPIRone (BUSPAR) 5 MG tablet Take 1 tablet by mouth 3 times daily as needed (for anxiety) Yes FROYLAN Berry acetaminophen (TYLENOL) 325 MG tablet Take 650 mg by mouth every 6 hours as needed for Pain Yes Historical Provider, MD       Social History     Tobacco Use    Smoking status: Former Smoker     Years: 15.00     Types: Cigarettes    Smokeless tobacco: Never Used   Substance Use Topics    Alcohol use: Not Currently    Drug use: Not Currently        Allergies   Allergen Reactions    Seasonal    ,   Past Medical History:   Diagnosis Date    Abnormal Pap smear of cervix     colpo 2017    Anxiety     Depression     resolve 2017   ,   Past Surgical History:   Procedure Laterality Date    APPENDECTOMY      TUBAL LIGATION Bilateral 12/16/2019   ,   Social History     Tobacco Use    Smoking status: Former Smoker     Years: 15.00     Types: Cigarettes    Smokeless tobacco: Never Used   Substance Use Topics    Alcohol use: Not Currently    Drug use: Not Currently   ,   Family History   Problem Relation Age of Onset    Other Other         daughter-heart murmur    Diabetes Maternal Grandmother     Cataracts Maternal Grandmother     Heart Disease Mother         four open heart surgeries    Glaucoma Neg Hx    ,   Immunization History   Administered Date(s) Administered    Influenza, Quadv, IM, PF (6 mo and older Fluzone, Flulaval, Fluarix, and 3 yrs and older Afluria) 10/22/2019    MMR 11/07/2018    Tdap (Boostrix, Adacel) 08/28/2018, 08/20/2019   ,   Health Maintenance   Topic Date Due    Varicella vaccine (1 of 2 - 2-dose childhood series) 11/11/1993    Flu vaccine (1) 09/01/2020    Cervical cancer screen  03/04/2023    DTaP/Tdap/Td vaccine (3 - Td) 08/20/2029    HIV screen  Completed    Hepatitis A vaccine  Aged Out    Hepatitis B vaccine  Aged Out    Hib vaccine  Aged Out    Meningococcal (ACWY) vaccine  Aged Out    Pneumococcal 0-64 years Vaccine  Aged Out       PHYSICAL EXAMINATION:

## 2020-12-18 ENCOUNTER — HOSPITAL ENCOUNTER (OUTPATIENT)
Dept: GENERAL RADIOLOGY | Age: 30
Discharge: HOME | End: 2020-12-20
Payer: MEDICAID

## 2020-12-18 ENCOUNTER — OFFICE VISIT (OUTPATIENT)
Dept: ORTHOPEDIC SURGERY | Age: 30
End: 2020-12-18
Payer: MEDICAID

## 2020-12-18 VITALS
HEART RATE: 96 BPM | WEIGHT: 139 LBS | BODY MASS INDEX: 26.24 KG/M2 | SYSTOLIC BLOOD PRESSURE: 98 MMHG | HEIGHT: 61 IN | DIASTOLIC BLOOD PRESSURE: 64 MMHG

## 2020-12-18 PROCEDURE — 1036F TOBACCO NON-USER: CPT | Performed by: PHYSICIAN ASSISTANT

## 2020-12-18 PROCEDURE — 73110 X-RAY EXAM OF WRIST: CPT

## 2020-12-18 PROCEDURE — 99213 OFFICE O/P EST LOW 20 MIN: CPT

## 2020-12-18 PROCEDURE — G8419 CALC BMI OUT NRM PARAM NOF/U: HCPCS | Performed by: PHYSICIAN ASSISTANT

## 2020-12-18 PROCEDURE — G8484 FLU IMMUNIZE NO ADMIN: HCPCS | Performed by: PHYSICIAN ASSISTANT

## 2020-12-18 PROCEDURE — G8427 DOCREV CUR MEDS BY ELIG CLIN: HCPCS | Performed by: PHYSICIAN ASSISTANT

## 2020-12-18 PROCEDURE — 99204 OFFICE O/P NEW MOD 45 MIN: CPT | Performed by: PHYSICIAN ASSISTANT

## 2020-12-18 NOTE — PROGRESS NOTES
Orthopedic Office Note  [facility] [address]  [address], [address]  [facility] [address] [phone]      CHIEF COMPLAINT:    Chief Complaint   Patient presents with    Wrist Pain     pablo wrist       HISTORY OF PRESENT ILLNESS:      The patient is a 31 y.o. female  who presents today for evaluation and treatment of bilateral wrist pain and numbness and tingling present in the first 4 fingers bilaterally. The patient states that the right hand is much worse and has been constant numbness and tingling for the past 3 months. The patient has tried wrist splints at night which does cut down on her symptoms but she continues to wake up with numb fingers. The patient states that it is especially uncomfortable with specific movements and repetitive activities and she has a electrical type shock going into the fingers. The patient states that it is worst in the right thumb. She has also tried a steroid taper and gabapentin without significant improvements. 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 Past Medical History:    Past Medical History:   Diagnosis Date    Abnormal Pap smear of cervix     colpo 2017    Anxiety     Depression     resolve 2017       Past Surgical History:    Past Surgical History:   Procedure Laterality Date    APPENDECTOMY      TUBAL LIGATION Bilateral 12/16/2019       Medications Prior to Admission:   Current Outpatient Medications   Medication Sig Dispense Refill    gabapentin (NEURONTIN) 100 MG capsule Take 1 capsule by mouth 3 times daily for 30 days.  90 capsule 1    DULoxetine (CYMBALTA) 30 MG extended release capsule Take 1 capsule by mouth daily 90 capsule 1    busPIRone (BUSPAR) 5 MG tablet Take 1 tablet by mouth 3 times daily as needed (anxiety) 90 tablet 1    busPIRone (BUSPAR) 5 MG tablet Take 1 tablet by mouth 3 times daily as needed (for anxiety) 30 tablet 5    acetaminophen (TYLENOL) 325 MG tablet Take 650 mg by mouth every 6 hours as needed for Pain       No current facility-administered medications for this visit. Allergies:  Seasonal    Social History:   Social History     Tobacco Use   Smoking Status Former Smoker    Years: 15.00    Types: Cigarettes   Smokeless Tobacco Never Used     Social History     Substance and Sexual Activity   Alcohol Use Not Currently     Social History     Substance and Sexual Activity   Drug Use Not Currently       Family History:  Family History   Problem Relation Age of Onset    Other Other         daughter-heart murmur    Diabetes Maternal Grandmother     Cataracts Maternal Grandmother     Heart Disease Mother         four open heart surgeries    Glaucoma Neg Hx          REVIEW OF SYSTEMS:  Please see the ROS form attached to today's encounter. I have reviewed it with the patient during the visit. All other systems were reviewed and are negative. PHYSICAL EXAM:  Patient is a age-appropriate 53-year-old female, alert and oriented ×3 and in no acute distress. Well-dressed and well-groomed and stands with normal body position. In a calm mood. Patient is normocephalic and exhibits nonlabored respirations. Clear conjunctiva and pupils that are reactive. Normal muscle tone and bulk. No lymphadenopathy. No open wounds, masses, or skin lesions. Deep tendon reflexes are intact and symmetric. Skin is intact. Palpable pulses distally. Brisk capillary refill. On examination, patient has a negative Spurling's maneuver. She has full cervical range of motion without abnormality. She has full shoulder, elbow, wrist, and finger range of motion. No instability of these joints. With respect to the hand she has a positive median nerve compression test, Phalen's, and Tinel's bilaterally more pronounced on the right. There is no significant thenar atrophy. Good strength in the hands.     EMG:  EMG was reviewed and revealed mild

## 2020-12-28 ENCOUNTER — TELEPHONE (OUTPATIENT)
Dept: PREADMISSION TESTING | Age: 30
End: 2020-12-28

## 2020-12-29 ENCOUNTER — TELEPHONE (OUTPATIENT)
Dept: PREADMISSION TESTING | Age: 30
End: 2020-12-29

## 2020-12-30 ENCOUNTER — PRE-PROCEDURE TELEPHONE (OUTPATIENT)
Dept: PREADMISSION TESTING | Age: 30
End: 2020-12-30

## 2021-01-04 ENCOUNTER — OFFICE VISIT (OUTPATIENT)
Dept: ORTHOPEDIC SURGERY | Age: 31
End: 2021-01-04
Payer: COMMERCIAL

## 2021-01-04 VITALS
HEART RATE: 64 BPM | WEIGHT: 139 LBS | DIASTOLIC BLOOD PRESSURE: 64 MMHG | SYSTOLIC BLOOD PRESSURE: 118 MMHG | HEIGHT: 61 IN | BODY MASS INDEX: 26.24 KG/M2

## 2021-01-04 DIAGNOSIS — G56.03 BILATERAL CARPAL TUNNEL SYNDROME: Primary | ICD-10-CM

## 2021-01-04 PROCEDURE — 99213 OFFICE O/P EST LOW 20 MIN: CPT | Performed by: ORTHOPAEDIC SURGERY

## 2021-01-04 PROCEDURE — G8427 DOCREV CUR MEDS BY ELIG CLIN: HCPCS | Performed by: ORTHOPAEDIC SURGERY

## 2021-01-04 PROCEDURE — G8484 FLU IMMUNIZE NO ADMIN: HCPCS | Performed by: ORTHOPAEDIC SURGERY

## 2021-01-04 PROCEDURE — 1036F TOBACCO NON-USER: CPT | Performed by: ORTHOPAEDIC SURGERY

## 2021-01-04 PROCEDURE — G8419 CALC BMI OUT NRM PARAM NOF/U: HCPCS | Performed by: ORTHOPAEDIC SURGERY

## 2021-01-04 PROCEDURE — 99213 OFFICE O/P EST LOW 20 MIN: CPT

## 2021-01-04 NOTE — PROGRESS NOTES
Orthopedic Office Note  [facility] [address]  [address], [address]  [facility] [address] [phone]      CHIEF COMPLAINT:    Chief Complaint   Patient presents with    Carpal Tunnel     discuss surgery pablo cts       HISTORY OF PRESENT ILLNESS:      The patient is a 31 y.o. female  who presents today for evaluation of bilateral hand paresthesias. She was previously seen by Deandra Genao for this problem and his office notes were reviewed. She has had bilateral hand paresthesias for many months with the right side being worse with the left. Symptoms wake her up on a nightly basis despite use wrist splints at night. 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 and feels as though this is exacerbate her symptoms. She also mentions that with talking on the phone and flexing her elbow she will notice at times this will cause hand paresthesias. Past Medical History:    Past Medical History:   Diagnosis Date    Abnormal Pap smear of cervix     colpo 2017    Anxiety     Depression     resolve 2017       Past Surgical History:    Past Surgical History:   Procedure Laterality Date    APPENDECTOMY      TUBAL LIGATION Bilateral 12/16/2019       Medications Prior to Admission:   Current Outpatient Medications   Medication Sig Dispense Refill    gabapentin (NEURONTIN) 100 MG capsule Take 1 capsule by mouth 3 times daily for 30 days. 90 capsule 1    DULoxetine (CYMBALTA) 30 MG extended release capsule Take 1 capsule by mouth daily 90 capsule 1    busPIRone (BUSPAR) 5 MG tablet Take 1 tablet by mouth 3 times daily as needed (anxiety) 90 tablet 1    busPIRone (BUSPAR) 5 MG tablet Take 1 tablet by mouth 3 times daily as needed (for anxiety) 30 tablet 5    acetaminophen (TYLENOL) 325 MG tablet Take 650 mg by mouth every 6 hours as needed for Pain       No current facility-administered medications for this visit. observing this for now. I have reviewed specific elbow positions to avoid and avoid resting her ulnar nerve on a hard object which were reviewed. She understands risks associated with surgery and has elected to proceed with bilateral endoscopic carpal tunnel releases. No orders of the defined types were placed in this encounter.        Lalita Humphrey MD

## 2021-01-06 ENCOUNTER — HOSPITAL ENCOUNTER (OUTPATIENT)
Dept: PREADMISSION TESTING | Age: 31
Setting detail: SPECIMEN
Discharge: HOME | End: 2021-01-10
Payer: COMMERCIAL

## 2021-01-06 DIAGNOSIS — Z11.59 ENCOUNTER FOR SCREENING FOR OTHER VIRAL DISEASES: Primary | ICD-10-CM

## 2021-01-06 PROCEDURE — U0003 INFECTIOUS AGENT DETECTION BY NUCLEIC ACID (DNA OR RNA); SEVERE ACUTE RESPIRATORY SYNDROME CORONAVIRUS 2 (SARS-COV-2) (CORONAVIRUS DISEASE [COVID-19]), AMPLIFIED PROBE TECHNIQUE, MAKING USE OF HIGH THROUGHPUT TECHNOLOGIES AS DESCRIBED BY CMS-2020-01-R: HCPCS

## 2021-01-08 LAB — SARS-COV-2, NAA: NOT DETECTED

## 2021-01-09 ENCOUNTER — TELEPHONE (OUTPATIENT)
Dept: PRIMARY CARE CLINIC | Age: 31
End: 2021-01-09

## 2021-01-11 ENCOUNTER — ANESTHESIA EVENT (OUTPATIENT)
Dept: OPERATING ROOM | Age: 31
End: 2021-01-11
Payer: COMMERCIAL

## 2021-01-11 ENCOUNTER — ANESTHESIA (OUTPATIENT)
Dept: OPERATING ROOM | Age: 31
End: 2021-01-11
Payer: COMMERCIAL

## 2021-01-11 ENCOUNTER — HOSPITAL ENCOUNTER (OUTPATIENT)
Age: 31
Setting detail: OUTPATIENT SURGERY
Discharge: HOME | End: 2021-01-11
Attending: ORTHOPAEDIC SURGERY | Admitting: ORTHOPAEDIC SURGERY
Payer: COMMERCIAL

## 2021-01-11 VITALS
DIASTOLIC BLOOD PRESSURE: 61 MMHG | OXYGEN SATURATION: 99 % | RESPIRATION RATE: 19 BRPM | SYSTOLIC BLOOD PRESSURE: 133 MMHG | TEMPERATURE: 98.1 F

## 2021-01-11 VITALS
BODY MASS INDEX: 26.68 KG/M2 | OXYGEN SATURATION: 100 % | HEART RATE: 96 BPM | SYSTOLIC BLOOD PRESSURE: 130 MMHG | HEIGHT: 62 IN | TEMPERATURE: 98 F | DIASTOLIC BLOOD PRESSURE: 68 MMHG | RESPIRATION RATE: 18 BRPM | WEIGHT: 145 LBS

## 2021-01-11 DIAGNOSIS — G56.03 BILATERAL CARPAL TUNNEL SYNDROME: Primary | ICD-10-CM

## 2021-01-11 LAB — HCG, PREGNANCY URINE (POC): NEGATIVE

## 2021-01-11 PROCEDURE — 2580000003 HC RX 258: Performed by: ORTHOPAEDIC SURGERY

## 2021-01-11 PROCEDURE — 2500000003 HC RX 250 WO HCPCS: Performed by: ORTHOPAEDIC SURGERY

## 2021-01-11 PROCEDURE — 81025 URINE PREGNANCY TEST: CPT

## 2021-01-11 PROCEDURE — 3700000001 HC ADD 15 MINUTES (ANESTHESIA): Performed by: ORTHOPAEDIC SURGERY

## 2021-01-11 PROCEDURE — 7100000010 HC PHASE II RECOVERY - FIRST 15 MIN: Performed by: ORTHOPAEDIC SURGERY

## 2021-01-11 PROCEDURE — 3600000002 HC SURGERY LEVEL 2 BASE: Performed by: ORTHOPAEDIC SURGERY

## 2021-01-11 PROCEDURE — 6360000002 HC RX W HCPCS: Performed by: ORTHOPAEDIC SURGERY

## 2021-01-11 PROCEDURE — 3700000000 HC ANESTHESIA ATTENDED CARE: Performed by: ORTHOPAEDIC SURGERY

## 2021-01-11 PROCEDURE — 29848 WRIST ENDOSCOPY/SURGERY: CPT | Performed by: ORTHOPAEDIC SURGERY

## 2021-01-11 PROCEDURE — 2709999900 HC NON-CHARGEABLE SUPPLY: Performed by: ORTHOPAEDIC SURGERY

## 2021-01-11 PROCEDURE — 3600000012 HC SURGERY LEVEL 2 ADDTL 15MIN: Performed by: ORTHOPAEDIC SURGERY

## 2021-01-11 PROCEDURE — 6360000002 HC RX W HCPCS: Performed by: NURSE ANESTHETIST, CERTIFIED REGISTERED

## 2021-01-11 PROCEDURE — 2500000003 HC RX 250 WO HCPCS: Performed by: NURSE ANESTHETIST, CERTIFIED REGISTERED

## 2021-01-11 PROCEDURE — 2720000010 HC SURG SUPPLY STERILE: Performed by: ORTHOPAEDIC SURGERY

## 2021-01-11 PROCEDURE — 7100000011 HC PHASE II RECOVERY - ADDTL 15 MIN: Performed by: ORTHOPAEDIC SURGERY

## 2021-01-11 RX ORDER — MIDAZOLAM HYDROCHLORIDE 1 MG/ML
INJECTION INTRAMUSCULAR; INTRAVENOUS PRN
Status: DISCONTINUED | OUTPATIENT
Start: 2021-01-11 | End: 2021-01-11 | Stop reason: SDUPTHER

## 2021-01-11 RX ORDER — FENTANYL CITRATE 50 UG/ML
INJECTION, SOLUTION INTRAMUSCULAR; INTRAVENOUS PRN
Status: DISCONTINUED | OUTPATIENT
Start: 2021-01-11 | End: 2021-01-11 | Stop reason: SDUPTHER

## 2021-01-11 RX ORDER — HYDROCODONE BITARTRATE AND ACETAMINOPHEN 5; 325 MG/1; MG/1
1 TABLET ORAL EVERY 6 HOURS PRN
Qty: 16 TABLET | Refills: 0 | Status: SHIPPED | OUTPATIENT
Start: 2021-01-11 | End: 2021-01-16

## 2021-01-11 RX ORDER — EPHEDRINE SULFATE/0.9% NACL/PF 50 MG/5 ML
SYRINGE (ML) INTRAVENOUS PRN
Status: DISCONTINUED | OUTPATIENT
Start: 2021-01-11 | End: 2021-01-11 | Stop reason: SDUPTHER

## 2021-01-11 RX ORDER — LIDOCAINE HYDROCHLORIDE 40 MG/ML
INJECTION, SOLUTION RETROBULBAR; TOPICAL PRN
Status: DISCONTINUED | OUTPATIENT
Start: 2021-01-11 | End: 2021-01-11 | Stop reason: SDUPTHER

## 2021-01-11 RX ORDER — BUPIVACAINE HYDROCHLORIDE 5 MG/ML
INJECTION, SOLUTION EPIDURAL; INTRACAUDAL PRN
Status: DISCONTINUED | OUTPATIENT
Start: 2021-01-11 | End: 2021-01-11 | Stop reason: ALTCHOICE

## 2021-01-11 RX ORDER — SODIUM CHLORIDE, SODIUM LACTATE, POTASSIUM CHLORIDE, CALCIUM CHLORIDE 600; 310; 30; 20 MG/100ML; MG/100ML; MG/100ML; MG/100ML
INJECTION, SOLUTION INTRAVENOUS CONTINUOUS
Status: DISCONTINUED | OUTPATIENT
Start: 2021-01-11 | End: 2021-01-11 | Stop reason: HOSPADM

## 2021-01-11 RX ORDER — PROPOFOL 10 MG/ML
INJECTION, EMULSION INTRAVENOUS CONTINUOUS PRN
Status: DISCONTINUED | OUTPATIENT
Start: 2021-01-11 | End: 2021-01-11 | Stop reason: SDUPTHER

## 2021-01-11 RX ADMIN — PROPOFOL 300 MCG/KG/MIN: 10 INJECTION, EMULSION INTRAVENOUS at 13:28

## 2021-01-11 RX ADMIN — Medication 10 MG: at 13:46

## 2021-01-11 RX ADMIN — Medication 10 MG: at 14:07

## 2021-01-11 RX ADMIN — Medication 10 MG: at 13:39

## 2021-01-11 RX ADMIN — SODIUM CHLORIDE, POTASSIUM CHLORIDE, SODIUM LACTATE AND CALCIUM CHLORIDE: 600; 310; 30; 20 INJECTION, SOLUTION INTRAVENOUS at 13:06

## 2021-01-11 RX ADMIN — LIDOCAINE HYDROCHLORIDE 100 MG: 40 INJECTION, SOLUTION RETROBULBAR; TOPICAL at 13:28

## 2021-01-11 RX ADMIN — Medication 2 G: at 13:30

## 2021-01-11 RX ADMIN — MIDAZOLAM HYDROCHLORIDE 2 MG: 1 INJECTION, SOLUTION INTRAMUSCULAR; INTRAVENOUS at 13:19

## 2021-01-11 RX ADMIN — FENTANYL CITRATE 100 MCG: 50 INJECTION, SOLUTION INTRAMUSCULAR; INTRAVENOUS at 13:19

## 2021-01-11 RX ADMIN — Medication 10 MG: at 13:52

## 2021-01-11 RX ADMIN — SODIUM CHLORIDE, POTASSIUM CHLORIDE, SODIUM LACTATE AND CALCIUM CHLORIDE: 600; 310; 30; 20 INJECTION, SOLUTION INTRAVENOUS at 13:52

## 2021-01-11 RX ADMIN — Medication 10 MG: at 13:42

## 2021-01-11 ASSESSMENT — LIFESTYLE VARIABLES: SMOKING_STATUS: 1

## 2021-01-11 ASSESSMENT — PAIN SCALES - GENERAL
PAINLEVEL_OUTOF10: 0
PAINLEVEL_OUTOF10: 0

## 2021-01-11 ASSESSMENT — PAIN - FUNCTIONAL ASSESSMENT: PAIN_FUNCTIONAL_ASSESSMENT: 0-10

## 2021-01-11 NOTE — FLOWSHEET NOTE
rounding on Surgery    Assessment: Patient is waiting to begin procedure and feeling a little anxious. She engaged easily in conversation. Intervention: Engaged in conversation.  prayed with patient and offered blessing. Patient expressed appreciation for visit and offer of continued prayer. Plan: Chaplains are available on site or on call 24/7 for spiritual and emotional support.      01/11/21 1252   Encounter Summary   Services provided to: Patient   Referral/Consult From: [facility] Family members   Place of Confucianism   ([facility])   Contact Scientologist No   Continue Visiting   (1/11/21)   Complexity of Encounter Moderate   Length of Encounter 15 minutes   Routine   Type Pre-procedure   Spiritual/Muslim   Type Spiritual support   Assessment Approachable   Intervention Active listening;Prayer   Outcome Expressed gratitude;Engaged in conversation

## 2021-01-11 NOTE — PROGRESS NOTES
STERILE FIELD TAKEN DOWN FOR RIGHT OP SITE. BED TURNED ET LEFT OP SITE PREPPED ET RE DRAPED IN STERILE MANNER.

## 2021-01-11 NOTE — ANESTHESIA POSTPROCEDURE EVALUATION
Department of Anesthesiology  Postprocedure Note    Patient: Bunny Bob  MRN: [id number]  YOB: 1990  Date of evaluation: 1/11/2021  Time:  2:19 PM     Procedure Summary     Date: 01/11/21 Room / Location: [address] [place] [address]    Anesthesia Start: 1319 Anesthesia Stop: 1419    Procedure: Bilateral Endoscopic Carpal Tunnel Relese (Bilateral ) Diagnosis: (bilateral carpal tunnel syndrome)    Surgeons: Cleo Lewis MD Responsible Provider: STEPHEN Hyatt CRNA    Anesthesia Type: general ASA Status: 2          Anesthesia Type: general    Jovanni Phase I: Jovanni Score: 10    Jovanni Phase II: Jovanni Score: 10    Last vitals: Reviewed and per EMR flowsheets.        Anesthesia Post Evaluation    Patient location during evaluation: PACU  Patient participation: complete - patient participated  Level of consciousness: awake and alert  Pain score: 0  Airway patency: patent  Nausea & Vomiting: no nausea and no vomiting  Complications: no  Cardiovascular status: blood pressure returned to baseline and hemodynamically stable  Respiratory status: acceptable, spontaneous ventilation and room air  Hydration status: euvolemic

## 2021-01-11 NOTE — OP NOTE
Operative Note      Patient: Mikhail Borja  YOB: 1990  MRN: [id number]    Date of Procedure: 1/11/2021    Pre-Op Diagnosis: bilateral carpal tunnel syndrome    Post-Op Diagnosis: Same       Procedure(s):  Bilateral Endoscopic Carpal Tunnel Relese    Surgeon(s):  Anders Walker MD    Assistant:   * No surgical staff found *    Anesthesia: Monitor Anesthesia Care    Estimated Blood Loss (mL): Minimal    Complications: None    Specimens:   * No specimens in log *    Implants:  * No implants in log *      Drains: * No LDAs found *    Findings: Release bilateral transverse carpal ligaments    Detailed Description of Procedure:   After informed consent was obtained the patient brought to the operating room where a monitored anesthetic was administered. The right hand was injected with 7 mL half percent Marcaine plain in the close incision site. In a similar fashion the left hand was also injected with 7 mL half percent Marcaine plain. The right arm was prepped and draped in the usual sterile fashion after placement a well-padded tourniquet. The arm was elevated, exsanguinated, and the tourniquet was inflated to 200 mmHg. Blunt dissection was carried down just ulnar to the palmaris longus. U based flap was created in the forearm fascia. The fascia was incised for 2 cm proximally under direct visualization. Attention was next turned to the endoscopic carpal tunnel release. Synovial elevator was used to clear the underside of the transverse carpal ligament of soft tissue. Sequential dilators were placed. Endoscopic carpal tunnel release instrument was then placed in the transverse fibers were identified and incised from distal to proximal.  Complete separation was achieved. The tourniquet was deflated after 4 minutes at 200 mmHg and hemostasis achieved with bipolar cautery. Wound was irrigated and closed with nylon suture. Sterile dressing was placed.     Attention was next turned to the left endoscopic carpal tunnel release. The left arm was prepped and draped in the usual sterile fashion after placement a well-padded tourniquet. The arm was elevated, exsanguinated, and the tourniquet was inflated to 200 mmHg. Blunt dissection was carried down just ulnar to the palmaris longus. U based flap was created in the forearm fascia. The fascia was incised for 2 cm proximally under direct visualization. Attention was next turned to the endoscopic carpal tunnel release. Synovial elevator was used to clear the underside of the transverse carpal ligament of soft tissue. Sequential dilators were placed. Endoscopic carpal tunnel release instrument was then placed in the transverse fibers were identified and incised from distal to proximal.  Complete separation was achieved. The tourniquet was deflated after 4 minutes at 200 mmHg and hemostasis achieved with bipolar cautery. Wound was irrigated and closed with nylon suture. Sterile dressing was placed. The patient was brought to the recovery room in stable condition. There were no intraoperative or immediate postoperative complications.     Electronically signed by Driss Valdovinos MD on 1/11/2021 at 2:24 PM

## 2021-01-25 ENCOUNTER — OFFICE VISIT (OUTPATIENT)
Dept: ORTHOPEDIC SURGERY | Age: 31
End: 2021-01-25
Payer: COMMERCIAL

## 2021-01-25 VITALS
WEIGHT: 145 LBS | SYSTOLIC BLOOD PRESSURE: 124 MMHG | BODY MASS INDEX: 26.68 KG/M2 | DIASTOLIC BLOOD PRESSURE: 62 MMHG | HEART RATE: 75 BPM | HEIGHT: 62 IN

## 2021-01-25 DIAGNOSIS — G56.03 BILATERAL CARPAL TUNNEL SYNDROME: ICD-10-CM

## 2021-01-25 PROCEDURE — 99024 POSTOP FOLLOW-UP VISIT: CPT | Performed by: ORTHOPAEDIC SURGERY

## 2021-01-25 PROCEDURE — 99213 OFFICE O/P EST LOW 20 MIN: CPT | Performed by: ORTHOPAEDIC SURGERY

## 2021-01-25 NOTE — PROGRESS NOTES
Orthopedic Office Note  [facility] [address]  [address] [facility], [address] [place] [id number] [phone]      CHIEF COMPLAINT:    Chief Complaint   Patient presents with    Carpal Tunnel     s/p pablo ctr1-11-21       HISTORY OF PRESENT ILLNESS:      The patient is a 31 y.o. female  who presents today for follow-up of her bilateral endoscopic carpal tunnel release is doing well. She reports preoperative paresthesias have completely resolved. She has minimal pain within her palms. Past Medical History:    Past Medical History:   Diagnosis Date    Abnormal Pap smear of cervix     colpo 2017    Anxiety     Depression     resolve 2017       Past Surgical History:    Past Surgical History:   Procedure Laterality Date    APPENDECTOMY      CARPAL TUNNEL RELEASE Bilateral 1/11/2021    Bilateral Endoscopic Carpal Tunnel Relese performed by Fany Pitt MD at [facility] Bilateral 12/16/2019       Medications Prior to Admission:   Current Outpatient Medications   Medication Sig Dispense Refill    DULoxetine (CYMBALTA) 30 MG extended release capsule Take 1 capsule by mouth daily 90 capsule 1    busPIRone (BUSPAR) 5 MG tablet Take 1 tablet by mouth 3 times daily as needed (for anxiety) 30 tablet 5    gabapentin (NEURONTIN) 100 MG capsule Take 1 capsule by mouth 3 times daily for 30 days. 90 capsule 1     No current facility-administered medications for this visit.         Allergies:  Seasonal    Social History:   Social History     Tobacco Use   Smoking Status Former Smoker    Years: 15.00    Types: Cigarettes   Smokeless Tobacco Never Used     Social History     Substance and Sexual Activity   Alcohol Use Not Currently     Social History     Substance and Sexual Activity   Drug Use Not Currently       Family History:  Family History   Problem Relation Age of Onset    Other Other         daughter-heart murmur   

## 2021-01-25 NOTE — LETTER
[facility] department of [place] [address]  Phone: [phone]  Fax: [phone]    Lalita Humphrey MD        January 25, 2021     Patient: Joy Faria   YOB: 1990   Date of Visit: 1/25/2021       To Whom It May Concern: It is my medical opinion that Oliver Maldonado may return to work on 3- with no restrictions. If you have any questions or concerns, please don't hesitate to call.     Sincerely,        Lalita Humphrey MD

## 2021-01-29 ENCOUNTER — VIRTUAL VISIT (OUTPATIENT)
Dept: FAMILY MEDICINE CLINIC | Age: 31
End: 2021-01-29
Payer: COMMERCIAL

## 2021-01-29 DIAGNOSIS — F41.8 ANXIETY WITH DEPRESSION: Primary | ICD-10-CM

## 2021-01-29 PROCEDURE — G8427 DOCREV CUR MEDS BY ELIG CLIN: HCPCS | Performed by: PHYSICIAN ASSISTANT

## 2021-01-29 PROCEDURE — 99213 OFFICE O/P EST LOW 20 MIN: CPT | Performed by: PHYSICIAN ASSISTANT

## 2021-01-29 PROCEDURE — 99212 OFFICE O/P EST SF 10 MIN: CPT | Performed by: PHYSICIAN ASSISTANT

## 2021-01-29 RX ORDER — ARIPIPRAZOLE 2 MG/1
2 TABLET ORAL DAILY
Qty: 30 TABLET | Refills: 0 | Status: SHIPPED | OUTPATIENT
Start: 2021-01-29 | End: 2021-02-12

## 2021-01-29 ASSESSMENT — PATIENT HEALTH QUESTIONNAIRE - PHQ9
SUM OF ALL RESPONSES TO PHQ QUESTIONS 1-9: 0
SUM OF ALL RESPONSES TO PHQ QUESTIONS 1-9: 0
1. LITTLE INTEREST OR PLEASURE IN DOING THINGS: 0

## 2021-02-12 ENCOUNTER — VIRTUAL VISIT (OUTPATIENT)
Dept: FAMILY MEDICINE CLINIC | Age: 31
End: 2021-02-12
Payer: COMMERCIAL

## 2021-02-12 DIAGNOSIS — R68.89 COMPLAINING OF HEAD SYMPTOM: ICD-10-CM

## 2021-02-12 DIAGNOSIS — F41.8 ANXIETY WITH DEPRESSION: Primary | ICD-10-CM

## 2021-02-12 PROCEDURE — 99211 OFF/OP EST MAY X REQ PHY/QHP: CPT

## 2021-02-12 PROCEDURE — 99212 OFFICE O/P EST SF 10 MIN: CPT | Performed by: PHYSICIAN ASSISTANT

## 2021-02-12 PROCEDURE — G8427 DOCREV CUR MEDS BY ELIG CLIN: HCPCS | Performed by: PHYSICIAN ASSISTANT

## 2021-02-12 RX ORDER — ARIPIPRAZOLE 5 MG/1
5 TABLET ORAL DAILY
Qty: 30 TABLET | Refills: 1 | Status: SHIPPED | OUTPATIENT
Start: 2021-02-12 | End: 2022-02-02 | Stop reason: ALTCHOICE

## 2021-02-12 ASSESSMENT — ENCOUNTER SYMPTOMS
TROUBLE SWALLOWING: 0
SHORTNESS OF BREATH: 0
WHEEZING: 0
NAUSEA: 0
CHEST TIGHTNESS: 0
VOMITING: 0
DIARRHEA: 0
SORE THROAT: 0
COUGH: 0
SINUS PRESSURE: 0
SINUS PAIN: 0
RHINORRHEA: 0

## 2021-02-12 ASSESSMENT — PATIENT HEALTH QUESTIONNAIRE - PHQ9
SUM OF ALL RESPONSES TO PHQ QUESTIONS 1-9: 0
SUM OF ALL RESPONSES TO PHQ9 QUESTIONS 1 & 2: 0

## 2021-02-12 NOTE — PROGRESS NOTES
2021    TELEHEALTH EVALUATION -- Audio/Visual (During BZHDK-52 public health emergency)    HPI:    Lee Thakkar (:  1990) has requested an audio/video evaluation for the following concern(s):    Patient is seen for virtual follow up on mood. We have her on abilify 2 mg daily. At this time the abilify is working but she still gets stressed out easily with dealing with her children. She is happy off the cymbalta has lost weight and not having acne like she had. She feels better. Still has mood swings. She never wants to be alone. Always needing to do something. She is trying to be calm and not lash out at kids. The abilify is helping some but still getting frustrated. Still gets brain zaps it is less. If kids are screaming or if really tired first in am notices. [email or web address] Costs is preventing this at this time. Review of Systems   Constitutional: Negative for appetite change, chills, fatigue and fever. HENT: Negative for congestion, postnasal drip, rhinorrhea, sinus pressure, sinus pain, sneezing, sore throat and trouble swallowing. Respiratory: Negative for cough, chest tightness, shortness of breath and wheezing. Cardiovascular: Negative for chest pain and palpitations. Gastrointestinal: Negative for diarrhea, nausea and vomiting. Genitourinary: Negative for difficulty urinating and dysuria. Musculoskeletal: Negative for arthralgias, gait problem and myalgias. Skin: Negative for rash. Neurological: Negative for dizziness, light-headedness, numbness and headaches. Psychiatric/Behavioral: Negative for sleep disturbance. The patient is not nervous/anxious. Prior to Visit Medications    Medication Sig Taking?  Authorizing Provider   ARIPiprazole (ABILIFY) 5 MG tablet Take 1 tablet by mouth daily Yes FROYLAN Sheikh   DULoxetine (CYMBALTA) 30 MG extended release capsule Take 1 capsule by mouth daily Yes FROYLAN Sheikh busPIRone (BUSPAR) 5 MG tablet Take 1 tablet by mouth 3 times daily as needed (for anxiety) Yes FROYLAN Jung   gabapentin (NEURONTIN) 100 MG capsule Take 1 capsule by mouth 3 times daily for 30 days.   FROYLAN Jung       Social History     Tobacco Use    Smoking status: Former Smoker     Years: 15.00     Types: Cigarettes    Smokeless tobacco: Never Used   Substance Use Topics    Alcohol use: Not Currently    Drug use: Not Currently        Allergies   Allergen Reactions    Seasonal    ,   Past Medical History:   Diagnosis Date    Abnormal Pap smear of cervix     colpo 2017    Anxiety     Depression     resolve 2017   ,   Past Surgical History:   Procedure Laterality Date    APPENDECTOMY      CARPAL TUNNEL RELEASE Bilateral 1/11/2021    Bilateral Endoscopic Carpal Tunnel Relese performed by Kaylin Hutson MD at [address] Bilateral 12/16/2019   ,   Social History     Tobacco Use    Smoking status: Former Smoker     Years: 15.00     Types: Cigarettes    Smokeless tobacco: Never Used   Substance Use Topics    Alcohol use: Not Currently    Drug use: Not Currently   ,   Family History   Problem Relation Age of Onset    Other Other         daughter-heart murmur    Diabetes Maternal Grandmother     Cataracts Maternal Grandmother     Heart Disease Mother         four open heart surgeries    Glaucoma Neg Hx    ,   Immunization History   Administered Date(s) Administered    Influenza, Quadv, IM, PF (6 mo and older Fluzone, Flulaval, Fluarix, and 3 yrs and older Afluria) 10/22/2019    MMR 11/07/2018    Tdap (Boostrix, Adacel) 08/28/2018, 08/20/2019   ,   Health Maintenance   Topic Date Due    Hepatitis C screen  1990    Varicella vaccine (1 of 2 - 2-dose childhood series) 11/11/1993    Flu vaccine (1) 09/01/2020    Cervical cancer screen  03/04/2023    DTaP/Tdap/Td vaccine (3 - Td) 08/20/2029    HIV screen  Completed    Hepatitis A vaccine  Aged Out  Hepatitis B vaccine  Aged Out    Hib vaccine  Aged Out    Meningococcal (ACWY) vaccine  Aged Out    Pneumococcal 0-64 years Vaccine  Aged Out       PHYSICAL EXAMINATION:  [ INSTRUCTIONS:  \"[x]\" Indicates a positive item  \"[]\" Indicates a negative item  -- DELETE ALL ITEMS NOT EXAMINED]  Vital Signs: (As obtained by patient/caregiver or practitioner observation)    Blood pressure-  Heart rate-    Respiratory rate- WNL   Temperature-  Pulse oximetry-     Constitutional: [x] Appears well-developed and well-nourished [x] No apparent distress      [] Abnormal-   Mental status  [x] Alert and awake  [x] Oriented to person/place/time [x]Able to follow commands      Eyes:  EOM    [x]  Normal  [] Abnormal-  Sclera  [x]  Normal  [] Abnormal -         Discharge [x]  None visible  [] Abnormal -    HENT:   [x] Normocephalic, atraumatic. [] Abnormal   [] Mouth/Throat: Mucous membranes are moist.     External Ears [x] Normal  [] Abnormal-     Neck: [x] No visualized mass     Pulmonary/Chest: [x] Respiratory effort normal.  [x] No visualized signs of difficulty breathing or respiratory distress        [] Abnormal-      Musculoskeletal:   [x] Normal gait with no signs of ataxia         [x] Normal range of motion of neck        [] Abnormal-       Neurological:        [x] No Facial Asymmetry (Cranial nerve 7 motor function) (limited exam to video visit)          [x] No gaze palsy        [] Abnormal-         Skin:        [x] No significant exanthematous lesions or discoloration noted on facial skin         [] Abnormal-            Psychiatric:       [] Normal Affect [x] No Hallucinations        [] Abnormal- she was tearful at times during our visit. Speech is nl responds appropriately when questioned. Good eye contact. She is coherent. No evidence for suicide or homicidal ideation. It was difficult she was driving initially in visit then got out of car and switched cars during visit. Other pertinent observable physical exam findings-     ASSESSMENT/PLAN:  1. Anxiety with depression    - ARIPiprazole (ABILIFY) 5 MG tablet; Take 1 tablet by mouth daily  Dispense: 30 tablet; Refill: 1  - CT HEAD WO CONTRAST; Future    2. Complaining of head symptom    - CT HEAD WO CONTRAST; Future        If brain zaps persist will have her see neurology  She needs to [facility] discussed this with patient  Coping mechanisms  Will increase the abilify see how she does any problems call office  Answered questions  Will be safe and get CT head with her sx make sure there is nothing else causing sx    Return in about 4 weeks (around 3/12/2021). sooner if problems      Irena Khan is a 31 y.o. female being evaluated by a Virtual Visit (video visit) encounter to address concerns as mentioned above. A caregiver was present when appropriate. Due to this being a TeleHealth encounter (During AXPAN-82 public health emergency), evaluation of the following organ systems was limited: Vitals/Constitutional/EENT/Resp/CV/GI//MS/Neuro/Skin/Heme-Lymph-Imm. Pursuant to the emergency declaration under the [address] [place] [address], [address] [place] [address] authority and the [employer] Act, this Virtual Visit was conducted with patient's (and/or legal guardian's) consent, to reduce the patient's risk of exposure to COVID-19 and provide necessary medical care. The patient (and/or legal guardian) has also been advised to contact this office for worsening conditions or problems, and seek emergency medical treatment and/or call 911 if deemed necessary. Patient identification was verified at the start of the visit: Yes    Total time spent on this encounter: 15 minutes    Services were provided through a video synchronous discussion virtually to substitute for in-person clinic visit. Patient and provider were located at their individual homes. --FROYLAN Ceballos on 2/12/2021 at 4:28 PM    An electronic signature was used to authenticate this note.

## 2021-02-15 ENCOUNTER — TELEPHONE (OUTPATIENT)
Dept: FAMILY MEDICINE CLINIC | Age: 31
End: 2021-02-15

## 2021-02-15 NOTE — TELEPHONE ENCOUNTER
Patient reached out to administration, Tomeka Waldrop reached out to patient, no answer, left voicemail.

## 2021-07-08 ENCOUNTER — OFFICE VISIT (OUTPATIENT)
Dept: OPTOMETRY | Age: 31
End: 2021-07-08
Payer: COMMERCIAL

## 2021-07-08 DIAGNOSIS — H52.13 MYOPIA OF BOTH EYES WITH ASTIGMATISM: Primary | ICD-10-CM

## 2021-07-08 DIAGNOSIS — H53.8 BLURRED VISION, BILATERAL: ICD-10-CM

## 2021-07-08 DIAGNOSIS — H52.203 MYOPIA OF BOTH EYES WITH ASTIGMATISM: Primary | ICD-10-CM

## 2021-07-08 PROCEDURE — G8427 DOCREV CUR MEDS BY ELIG CLIN: HCPCS | Performed by: OPTOMETRIST

## 2021-07-08 PROCEDURE — 92083 EXTENDED VISUAL FIELD XM: CPT | Performed by: OPTOMETRIST

## 2021-07-08 PROCEDURE — 1036F TOBACCO NON-USER: CPT | Performed by: OPTOMETRIST

## 2021-07-08 PROCEDURE — 92014 COMPRE OPH EXAM EST PT 1/>: CPT | Performed by: OPTOMETRIST

## 2021-07-08 PROCEDURE — G8419 CALC BMI OUT NRM PARAM NOF/U: HCPCS | Performed by: OPTOMETRIST

## 2021-07-08 ASSESSMENT — REFRACTION_WEARINGRX
SPECS_TYPE: SVL
OS_CYLINDER: -0.50
OD_SPHERE: -0.75
OS_SPHERE: -0.50
OD_CYLINDER: -0.75
OD_AXIS: 180
OS_AXIS: 170

## 2021-07-08 ASSESSMENT — REFRACTION_MANIFEST
OD_SPHERE: -0.75
OS_SPHERE: -0.50
OS_CYLINDER: -0.50
OD_CYLINDER: -0.75
OD_AXIS: 002
OS_AXIS: 177

## 2021-07-08 ASSESSMENT — SLIT LAMP EXAM - LIDS
COMMENTS: NORMAL
COMMENTS: NORMAL

## 2021-07-08 ASSESSMENT — KERATOMETRY
OD_AXISANGLE2_DEGREES: 001
OD_AXISANGLE_DEGREES: 091
METHOD_AUTO_MANUAL: AUTOMATED
OS_AXISANGLE_DEGREES: 090
OS_K1POWER_DIOPTERS: 44.00
OS_AXISANGLE2_DEGREES: 180
OD_K2POWER_DIOPTERS: 44.25
OD_K1POWER_DIOPTERS: 43.25
OS_K2POWER_DIOPTERS: 45.00

## 2021-07-08 ASSESSMENT — ENCOUNTER SYMPTOMS
RESPIRATORY NEGATIVE: 0
EYES NEGATIVE: 0
ALLERGIC/IMMUNOLOGIC NEGATIVE: 0
GASTROINTESTINAL NEGATIVE: 0

## 2021-07-08 ASSESSMENT — REFRACTION_CURRENTRX
OD_SPHERE: -1.00
OS_SPHERE: -1.00

## 2021-07-08 ASSESSMENT — TONOMETRY
OS_IOP_MMHG: 17
IOP_METHOD: NON-CONTACT AIR PUFF
OD_IOP_MMHG: 12

## 2021-07-08 ASSESSMENT — VISUAL ACUITY
METHOD: SNELLEN - LINEAR
OS_CC: 20/25
OD_CC+: -1

## 2021-07-08 NOTE — PROGRESS NOTES
Awilda Whalen presents today for   Chief Complaint   Patient presents with    Blurred Vision    Vision Exam   .    HPI     Blurred Vision     In both eyes. Vision is blurred. Context:  distance vision. Comments     Last Vision Exam: 7/29/2020 AW  Last Ophthalmology Exam: n/a  Last Filled Glasses Rx: 7/29/2020  Insurance: [facility]  Update: Contacts and Glasses  Distance is getting a little more blurry                 Current Outpatient Medications   Medication Sig Dispense Refill    ARIPiprazole (ABILIFY) 5 MG tablet Take 1 tablet by mouth daily 30 tablet 1    DULoxetine (CYMBALTA) 30 MG extended release capsule Take 1 capsule by mouth daily 90 capsule 1    busPIRone (BUSPAR) 5 MG tablet Take 1 tablet by mouth 3 times daily as needed (for anxiety) 30 tablet 5    gabapentin (NEURONTIN) 100 MG capsule Take 1 capsule by mouth 3 times daily for 30 days. 90 capsule 1     No current facility-administered medications for this visit.          Family History   Problem Relation Age of Onset    Other Other         daughter-heart murmur    Diabetes Maternal Grandmother     Cataracts Maternal Grandmother     Heart Disease Mother         four open heart surgeries    Glaucoma Neg Hx      Social History     Socioeconomic History    Marital status:      Spouse name: None    Number of children: None    Years of education: None    Highest education level: None   Occupational History    None   Tobacco Use    Smoking status: Former Smoker     Years: 15.00     Types: Cigarettes    Smokeless tobacco: Never Used   Vaping Use    Vaping Use: Never used   Substance and Sexual Activity    Alcohol use: Not Currently    Drug use: Not Currently    Sexual activity: Yes     Partners: Male     Birth control/protection: Surgical     Comment: Tubal 2019   Other Topics Concern    None   Social History Narrative    ** Merged History Encounter **         ** Merged History Encounter **          Social Determinants of Health     Financial Resource Strain:     Difficulty of Paying Living Expenses:    Food Insecurity:     Worried About Running Out of Food in the Last Year:     [address] in the Last Year:    Transportation Needs:     Lack of Transportation (Medical):      Lack of Transportation (Non-Medical):    Physical Activity:     Days of Exercise per Week:     Minutes of Exercise per Session:    Stress:     Feeling of Stress :    Social Connections:     Frequency of Communication with Friends and Family:     Frequency of Social Gatherings with Friends and Family:     Attends Christian Services:     Active Member of Clubs or Organizations:     Attends Club or Organization Meetings:     Marital Status:    Intimate Partner Violence:     Fear of Current or Ex-Partner:     Emotionally Abused:     Physically Abused:     Sexually Abused:      Past Medical History:   Diagnosis Date    Abnormal Pap smear of cervix     colpo 2017    Anxiety     Depression     resolve 2017       ROS     Negative for: Constitutional, Gastrointestinal, Neurological, Skin, Genitourinary, Musculoskeletal, HENT, Endocrine, Cardiovascular, Eyes, Respiratory, Psychiatric, Allergic/Imm, Heme/Lymph          Main Ophthalmology Exam     External Exam       Right Left    External Normal Normal          Slit Lamp Exam       Right Left    Lids/Lashes Normal Normal    Conjunctiva/Sclera White and quiet White and quiet    Cornea Clear Clear    Anterior Chamber Deep and quiet Deep and quiet    Iris Round and reactive Round and reactive    Lens Clear Clear    Vitreous Normal Normal          Fundus Exam       Right Left    Disc Normal Normal    C/D Ratio 0.2 0.2    Macula Normal Normal    Vessels Normal Normal                   Tonometry     Tonometry (Non-contact air puff, 3:54 PM)       Right Left    Pressure 12 17   IOP.1             12.7  CH:  10.9          7.3  WS: 8.2          7.6                  Not recorded Not recorded          Visual Acuity (Snellen - Linear)       Right Left    Dist cc 20/25 -1 20/25           Not recorded         Not recorded        Keratometry     Keratometry (Automated)       K1 Axis K2 Axis    Right 43.25 001 44.25 091    Left 44.00 180 45.00 090                  Ophthalmology Exam     Wearing Rx       Sphere Cylinder Axis    Right -0.75 -0.75 180    Left -0.50 -0.50 170    Age: 3yr    Type: SVL              Manifest Refraction     Manifest Refraction       Sphere Cylinder Axis Dist VA    Right -0.75 -0.75 002 20/20    Left -0.50 -0.50 177 20/20          Manifest Refraction #2 (Auto)       Sphere Cylinder Axis Dist VA    Right -0.75 -0.50 002     Left -0.50 -0.75 177                Final Rx       Sphere Cylinder Axis    Right -0.75 -0.75 002    Left -0.50 -0.50 177    Type: SVL    Expiration Date: 7/9/2023          Final Contact Lens Rx       Brand Base Curve Sphere    Right [employer] biotru daily  8.6 -1.00    Left [employer] biotru daily  8.6 -1.00    Expiration Date: 7/9/2022    Replacement: Daily    Wearing Schedule: Daily wear          No orders of the defined types were placed in this encounter. IMPRESSION:  1. Myopia of both eyes with astigmatism    2. Blurred vision, bilateral        PLAN:    1.  New glasses if desired;  New contact lenses will be ordered    2 boxes of 90     Patient Instructions   New glasses recommended      Return in about 1 year (around 7/8/2022) for complete eye exam.

## 2021-09-28 ENCOUNTER — OFFICE VISIT (OUTPATIENT)
Dept: OBGYN | Age: 31
End: 2021-09-28
Payer: COMMERCIAL

## 2021-09-28 ENCOUNTER — HOSPITAL ENCOUNTER (OUTPATIENT)
Age: 31
Setting detail: SPECIMEN
Discharge: HOME | End: 2021-09-28
Payer: COMMERCIAL

## 2021-09-28 VITALS
WEIGHT: 128.6 LBS | OXYGEN SATURATION: 98 % | SYSTOLIC BLOOD PRESSURE: 110 MMHG | HEART RATE: 80 BPM | BODY MASS INDEX: 23.66 KG/M2 | HEIGHT: 62 IN | DIASTOLIC BLOOD PRESSURE: 64 MMHG

## 2021-09-28 DIAGNOSIS — N89.8 VAGINAL DISCHARGE: Primary | ICD-10-CM

## 2021-09-28 DIAGNOSIS — N89.8 VAGINAL DISCHARGE: ICD-10-CM

## 2021-09-28 DIAGNOSIS — N89.8 VAGINAL ITCHING: ICD-10-CM

## 2021-09-28 DIAGNOSIS — N76.0 BV (BACTERIAL VAGINOSIS): ICD-10-CM

## 2021-09-28 DIAGNOSIS — B96.89 BV (BACTERIAL VAGINOSIS): ICD-10-CM

## 2021-09-28 PROCEDURE — 87480 CANDIDA DNA DIR PROBE: CPT

## 2021-09-28 PROCEDURE — 87510 GARDNER VAG DNA DIR PROBE: CPT

## 2021-09-28 PROCEDURE — 87660 TRICHOMONAS VAGIN DIR PROBE: CPT

## 2021-09-28 PROCEDURE — 99213 OFFICE O/P EST LOW 20 MIN: CPT | Performed by: ADVANCED PRACTICE MIDWIFE

## 2021-09-28 PROCEDURE — G8420 CALC BMI NORM PARAMETERS: HCPCS | Performed by: ADVANCED PRACTICE MIDWIFE

## 2021-09-28 PROCEDURE — 87591 N.GONORRHOEAE DNA AMP PROB: CPT

## 2021-09-28 PROCEDURE — G8427 DOCREV CUR MEDS BY ELIG CLIN: HCPCS | Performed by: ADVANCED PRACTICE MIDWIFE

## 2021-09-28 PROCEDURE — 87491 CHLMYD TRACH DNA AMP PROBE: CPT

## 2021-09-28 PROCEDURE — 1036F TOBACCO NON-USER: CPT | Performed by: ADVANCED PRACTICE MIDWIFE

## 2021-09-28 RX ORDER — METRONIDAZOLE 500 MG/1
500 TABLET ORAL 2 TIMES DAILY WITH MEALS
Qty: 14 TABLET | Refills: 1 | Status: SHIPPED | OUTPATIENT
Start: 2021-09-28 | End: 2021-10-05

## 2021-09-28 ASSESSMENT — ENCOUNTER SYMPTOMS
RESPIRATORY NEGATIVE: 1
EYES NEGATIVE: 1
GASTROINTESTINAL NEGATIVE: 1

## 2021-09-28 NOTE — PROGRESS NOTES
Subjective:      Patient ID: Shagufta Patten  is a 31 y.o. y.o. female. Jameson Sawyer presents today with report of recurrent vaginal discharge, slight odor and itch. She reports that it is uncomfortable. She is  and sexually active, they are mutually monogamous. She reports that they have a hot tub and she thinks that may be part of the problem. Review of Systems   Constitutional: Negative. HENT: Negative. Eyes: Negative. Respiratory: Negative. Cardiovascular: Negative. Gastrointestinal: Negative. Genitourinary: Positive for vaginal discharge. Musculoskeletal: Negative. Skin: Negative. Neurological: Negative. Psychiatric/Behavioral: Negative. Breast ROS: negative    Objective:   /64 (Site: Right Upper Arm, Position: Sitting, Cuff Size: Medium Adult)   Pulse 80   Ht 5' 2\" (1.575 m)   Wt 128 lb 9.6 oz (58.3 kg)   LMP 09/20/2021 (Exact Date)   SpO2 98%   Breastfeeding No   BMI 23.52 kg/m²   Physical Exam  Constitutional:       Appearance: She is well-developed. HENT:      Head: Normocephalic and atraumatic. Eyes:      Conjunctiva/sclera: Conjunctivae normal.   Cardiovascular:      Rate and Rhythm: Normal rate and regular rhythm. Heart sounds: Normal heart sounds. Pulmonary:      Effort: Pulmonary effort is normal.      Breath sounds: Normal breath sounds. Abdominal:      Palpations: Abdomen is soft. Genitourinary:     General: Normal vulva. Vagina: Normal.      Comments: External genitalia WNL, no lesions noted. Vaginal canal is pink with rugae present. Note thin white mildly malodorous discharge. Cervix is parous, freely mobile and nontender. Vaginitis swab and GC/CT probes obtained. Musculoskeletal:         General: Normal range of motion. Cervical back: Normal range of motion and neck supple. Skin:     General: Skin is warm and dry. Neurological:      Mental Status: She is alert and oriented to person, place, and time.       Deep Tendon Reflexes: Reflexes are normal and symmetric. Psychiatric:         Mood and Affect: Mood normal.         Thought Content: Thought content normal.           Assessment:      Diagnosis Orders   1. Vaginal discharge  VAGINITIS DNA PROBE    C.trachomatis N.gonorrhoeae DNA   2. Vaginal itching  VAGINITIS DNA PROBE   3. BV (bacterial vaginosis)  metroNIDAZOLE (FLAGYL) 500 MG tablet           Plan:     Orders Placed This Encounter   Procedures    VAGINITIS DNA PROBE     Standing Status:   Future     Number of Occurrences:   1     Standing Expiration Date:   3/28/2022    C.trachomatis N.gonorrhoeae DNA     Standing Status:   Future     Number of Occurrences:   1     Standing Expiration Date:   3/28/2022   Education: discussed BV, will treat and then follow-up with boric acid capsules. She agrees with plan. RTO 3 months for annual  Fifteen minutes spent in exam and evaluation.

## 2021-09-29 LAB
C TRACH DNA GENITAL QL NAA+PROBE: NEGATIVE
DIRECT EXAM: ABNORMAL
Lab: ABNORMAL
N. GONORRHOEAE DNA: NEGATIVE
SPECIMEN DESCRIPTION: ABNORMAL
SPECIMEN DESCRIPTION: NORMAL

## 2021-09-29 NOTE — RESULT ENCOUNTER NOTE
Please notify patient lab results WNL. BV also present and she has been appropriately treated.  DA/NANCYM

## 2022-02-02 ENCOUNTER — HOSPITAL ENCOUNTER (OUTPATIENT)
Age: 32
Setting detail: SPECIMEN
Discharge: HOME | End: 2022-02-02
Payer: COMMERCIAL

## 2022-02-02 ENCOUNTER — OFFICE VISIT (OUTPATIENT)
Dept: OBGYN | Age: 32
End: 2022-02-02
Payer: COMMERCIAL

## 2022-02-02 VITALS
DIASTOLIC BLOOD PRESSURE: 60 MMHG | HEART RATE: 80 BPM | SYSTOLIC BLOOD PRESSURE: 110 MMHG | OXYGEN SATURATION: 98 % | WEIGHT: 125.4 LBS | BODY MASS INDEX: 23.08 KG/M2 | HEIGHT: 62 IN

## 2022-02-02 DIAGNOSIS — Z12.4 SCREENING FOR MALIGNANT NEOPLASM OF CERVIX: ICD-10-CM

## 2022-02-02 DIAGNOSIS — R30.0 DYSURIA: ICD-10-CM

## 2022-02-02 DIAGNOSIS — N94.3 PMS (PREMENSTRUAL SYNDROME): ICD-10-CM

## 2022-02-02 DIAGNOSIS — N76.0 BV (BACTERIAL VAGINOSIS): ICD-10-CM

## 2022-02-02 DIAGNOSIS — N89.8 VAGINAL DISCHARGE: ICD-10-CM

## 2022-02-02 DIAGNOSIS — Z01.419 WOMEN'S ANNUAL ROUTINE GYNECOLOGICAL EXAMINATION: Primary | ICD-10-CM

## 2022-02-02 DIAGNOSIS — Z11.3 ROUTINE SCREENING FOR STI (SEXUALLY TRANSMITTED INFECTION): ICD-10-CM

## 2022-02-02 DIAGNOSIS — B96.89 BV (BACTERIAL VAGINOSIS): ICD-10-CM

## 2022-02-02 LAB
-: ABNORMAL
AMORPHOUS: ABNORMAL
BACTERIA: ABNORMAL
BILIRUBIN URINE: NEGATIVE
CASTS UA: ABNORMAL /LPF (ref 0–2)
COLOR: ABNORMAL
COMMENT UA: ABNORMAL
CRYSTALS, UA: ABNORMAL /HPF
EPITHELIAL CELLS UA: ABNORMAL /HPF (ref 0–5)
GLUCOSE URINE: NEGATIVE
KETONES, URINE: NEGATIVE
LEUKOCYTE ESTERASE, URINE: NEGATIVE
MUCUS: ABNORMAL
NITRITE, URINE: NEGATIVE
OTHER OBSERVATIONS UA: ABNORMAL
PH UA: 7 (ref 5–6)
PROTEIN UA: NEGATIVE
RBC UA: ABNORMAL /HPF (ref 0–4)
RENAL EPITHELIAL, UA: ABNORMAL /HPF
SPECIFIC GRAVITY UA: 1.02 (ref 1.01–1.02)
TRICHOMONAS: ABNORMAL
TURBIDITY: ABNORMAL
URINE HGB: NEGATIVE
UROBILINOGEN, URINE: NORMAL
WBC UA: ABNORMAL /HPF (ref 0–4)
YEAST: ABNORMAL

## 2022-02-02 PROCEDURE — 99395 PREV VISIT EST AGE 18-39: CPT | Performed by: ADVANCED PRACTICE MIDWIFE

## 2022-02-02 PROCEDURE — 87591 N.GONORRHOEAE DNA AMP PROB: CPT

## 2022-02-02 PROCEDURE — 87491 CHLMYD TRACH DNA AMP PROBE: CPT

## 2022-02-02 PROCEDURE — 81001 URINALYSIS AUTO W/SCOPE: CPT

## 2022-02-02 PROCEDURE — G0145 SCR C/V CYTO,THINLAYER,RESCR: HCPCS

## 2022-02-02 PROCEDURE — 87086 URINE CULTURE/COLONY COUNT: CPT

## 2022-02-02 PROCEDURE — G8484 FLU IMMUNIZE NO ADMIN: HCPCS | Performed by: ADVANCED PRACTICE MIDWIFE

## 2022-02-02 PROCEDURE — 81001 URINALYSIS AUTO W/SCOPE: CPT | Performed by: ADVANCED PRACTICE MIDWIFE

## 2022-02-02 RX ORDER — METRONIDAZOLE 500 MG/1
500 TABLET ORAL 2 TIMES DAILY
Qty: 14 TABLET | Refills: 0 | Status: SHIPPED | OUTPATIENT
Start: 2022-02-02 | End: 2022-02-09

## 2022-02-02 RX ORDER — FLUOXETINE HYDROCHLORIDE 10 MG/1
1 CAPSULE ORAL DAILY
Qty: 90 CAPSULE | Refills: 2 | Status: SHIPPED | OUTPATIENT
Start: 2022-02-02

## 2022-02-02 ASSESSMENT — ENCOUNTER SYMPTOMS
GASTROINTESTINAL NEGATIVE: 1
RESPIRATORY NEGATIVE: 1
EYES NEGATIVE: 1

## 2022-02-02 NOTE — PROGRESS NOTES
Subjective:      Patient ID: Rocio Lee  is a 36 y.o. y.o. female. Xavi Daniel presents today for annual examination. She reports menses are irregular, 28-45 days and bleeding for 4-6 days with moderate flow and minimal cramping. She reports some burning upon urination and vaginal discharge. She is concerned that she has recurrent BV. She is  and sexually active, and denies pain or bleeding with intercourse. Contraception a bilateral salpingectomy. She also reports PMS starting midcycle, being very short fused and irritable. Review of Systems   Constitutional: Negative. HENT: Negative. Eyes: Negative. Respiratory: Negative. Cardiovascular: Negative. Gastrointestinal: Negative. Genitourinary: Positive for dysuria, menstrual problem and vaginal discharge. Musculoskeletal: Negative. Skin: Negative. Neurological: Negative. Psychiatric/Behavioral: Negative. Breast ROS: negative    Objective:   /60   Pulse 80   Ht 5' 2\" (1.575 m)   Wt 125 lb 6.4 oz (56.9 kg)   LMP 01/15/2022   SpO2 98%   BMI 22.94 kg/m²   Physical Exam  Constitutional:       Appearance: She is well-developed. HENT:      Head: Normocephalic and atraumatic. Eyes:      Conjunctiva/sclera: Conjunctivae normal.   Cardiovascular:      Rate and Rhythm: Normal rate and regular rhythm. Heart sounds: Normal heart sounds. Pulmonary:      Effort: Pulmonary effort is normal.      Breath sounds: Normal breath sounds. Chest:   Breasts: Breasts are symmetrical.      Right: No inverted nipple, mass, nipple discharge, skin change or tenderness. Left: No inverted nipple, mass, nipple discharge, skin change or tenderness. Abdominal:      Palpations: Abdomen is soft. Genitourinary:     General: Normal vulva. Vagina: Normal.      Comments: External genitalia WNL, no lesions noted. Vaginal canal is pink with rugae present and normal appearing nonodorous discharge.  Cervix is parous, freely performed by                Elaine Queen MD at [address] [place] [address]  12/16/2019: TUBAL LIGATION; Bilateral      Social History    Tobacco Use      Smoking status: Former Smoker        Years: 15.00        Types: Cigarettes      Smokeless tobacco: Never Used       Standing Status:   Future     Number of Occurrences:   1     Standing Expiration Date:   2/2/2023     Order Specific Question:   Collection Type     Answer: Thin Prep     Order Specific Question:   Prior Abnormal Pap Test     Answer:   No     Order Specific Question:   Screening or Diagnostic     Answer:   Screening     Order Specific Question:   HPV Requested? Answer:   Yes -  If ASCUS Reflex HPV     Order Specific Question:   High Risk Patient     Answer:   N/A    Urinalysis with Microscopic     Standing Status:   Future     Number of Occurrences:   1     Standing Expiration Date:   2/2/2023     Order Specific Question:   SPECIFY(EX-CATH,MIDSTREAM,CYSTO,ETC)? Answer:   CCMS   Education: discussed diet with calcium intake 9512-2281 mg./day with weight bearing exercise 30-50 minutes 3-5x/week. SBE monthly, screening mammogram age 38. Ds'd fluoxitine to control PMS, she is willing to trial. RTO 12 months and prn.

## 2022-02-03 LAB
CHLAMYDIA BY THIN PREP: NEGATIVE
CULTURE: NO GROWTH
Lab: NORMAL
N. GONORRHOEAE DNA, THIN PREP: NEGATIVE
SPECIMEN DESCRIPTION: NORMAL
SPECIMEN DESCRIPTION: NORMAL

## 2022-02-14 LAB — CYTOLOGY REPORT: NORMAL

## 2022-04-01 ENCOUNTER — TELEPHONE (OUTPATIENT)
Dept: OBGYN | Age: 32
End: 2022-04-01

## 2022-04-01 DIAGNOSIS — N89.8 VAGINAL DISCHARGE: Primary | ICD-10-CM

## 2022-04-01 RX ORDER — METRONIDAZOLE 500 MG/1
500 TABLET ORAL 2 TIMES DAILY
Qty: 14 TABLET | Refills: 0 | Status: SHIPPED | OUTPATIENT
Start: 2022-04-01 | End: 2022-04-08

## 2022-04-01 RX ORDER — METRONIDAZOLE 500 MG/1
500 TABLET ORAL 3 TIMES DAILY
Refills: 0 | Status: CANCELLED | OUTPATIENT
Start: 2022-04-01 | End: 2022-04-11

## 2022-04-01 NOTE — TELEPHONE ENCOUNTER
Patient called and states she is having itching, cloudy discharge without odor. Irritation is present. Was in office a few months ago. She is asking for a script for over the weekend until she can come in for an appointment.  Patient uses [facility] in [place]

## 2022-04-21 ENCOUNTER — OFFICE VISIT (OUTPATIENT)
Dept: OBGYN | Age: 32
End: 2022-04-21
Payer: COMMERCIAL

## 2022-04-21 ENCOUNTER — HOSPITAL ENCOUNTER (OUTPATIENT)
Age: 32
Setting detail: SPECIMEN
Discharge: HOME | End: 2022-04-21
Payer: COMMERCIAL

## 2022-04-21 VITALS
HEIGHT: 61 IN | HEART RATE: 83 BPM | OXYGEN SATURATION: 98 % | BODY MASS INDEX: 24.09 KG/M2 | SYSTOLIC BLOOD PRESSURE: 106 MMHG | DIASTOLIC BLOOD PRESSURE: 68 MMHG | WEIGHT: 127.6 LBS

## 2022-04-21 DIAGNOSIS — N89.8 VAGINAL ITCHING: Primary | ICD-10-CM

## 2022-04-21 DIAGNOSIS — R10.2 PELVIC PAIN: ICD-10-CM

## 2022-04-21 DIAGNOSIS — N89.8 VAGINAL ITCHING: ICD-10-CM

## 2022-04-21 LAB
CANDIDA SPECIES, DNA PROBE: NEGATIVE
GARDNERELLA VAGINALIS, DNA PROBE: NEGATIVE
SOURCE: NORMAL
TRICHOMONAS VAGINALIS DNA: NEGATIVE

## 2022-04-21 PROCEDURE — G8420 CALC BMI NORM PARAMETERS: HCPCS | Performed by: ADVANCED PRACTICE MIDWIFE

## 2022-04-21 PROCEDURE — 99213 OFFICE O/P EST LOW 20 MIN: CPT | Performed by: ADVANCED PRACTICE MIDWIFE

## 2022-04-21 PROCEDURE — G8427 DOCREV CUR MEDS BY ELIG CLIN: HCPCS | Performed by: ADVANCED PRACTICE MIDWIFE

## 2022-04-21 PROCEDURE — 1036F TOBACCO NON-USER: CPT | Performed by: ADVANCED PRACTICE MIDWIFE

## 2022-04-21 PROCEDURE — 87480 CANDIDA DNA DIR PROBE: CPT

## 2022-04-21 PROCEDURE — 87510 GARDNER VAG DNA DIR PROBE: CPT

## 2022-04-21 PROCEDURE — 87660 TRICHOMONAS VAGIN DIR PROBE: CPT

## 2022-04-21 ASSESSMENT — ENCOUNTER SYMPTOMS
ABDOMINAL DISTENTION: 1
EYES NEGATIVE: 1
RESPIRATORY NEGATIVE: 1

## 2023-11-30 ENCOUNTER — TELEPHONE (OUTPATIENT)
Dept: OBGYN | Age: 33
End: 2023-11-30

## 2023-11-30 DIAGNOSIS — R39.15 URINARY URGENCY: Primary | ICD-10-CM

## 2023-12-01 ENCOUNTER — TELEPHONE (OUTPATIENT)
Dept: OBGYN | Age: 33
End: 2023-12-01

## 2023-12-01 NOTE — TELEPHONE ENCOUNTER
Patient is wanting to have labs drawn. Patient states she is feeling worn down and \"just not feeling like herself\" Patient would like to have standard and hormonal test drawn. Patient believes she has a hormone imbalance. Patients next appointment is 02/05/2023.

## 2024-08-27 NOTE — ANESTHESIA PRE PROCEDURE
Counseling given: Not Answered      Vital Signs (Current):   Vitals:    01/11/21 1249   BP: (!) 105/55   Pulse: 69   Resp: 16   Temp: 36.7 °C (98 °F)   TempSrc: Oral   SpO2: 100%   Weight: 145 lb (65.8 kg)   Height: 5' 2\" (1.575 m)                                              BP Readings from Last 3 Encounters:   01/11/21 (!) 105/55   01/04/21 118/64   12/18/20 98/64       NPO Status: Time of last liquid consumption: 0400(sips of water)                        Time of last solid consumption: 2100                        Date of last liquid consumption: 01/11/21                        Date of last solid food consumption: 01/10/21    BMI:   Wt Readings from Last 3 Encounters:   01/11/21 145 lb (65.8 kg)   01/04/21 139 lb (63 kg)   12/18/20 139 lb (63 kg)     Body mass index is 26.52 kg/m². CBC:   Lab Results   Component Value Date    WBC 8.2 12/13/2019    RBC 3.81 12/13/2019    HGB 10.8 12/13/2019    HCT 33.2 12/13/2019    MCV 87.3 12/13/2019    RDW 14.2 12/13/2019     12/13/2019       CMP:   Lab Results   Component Value Date     09/17/2019    K 4.0 09/17/2019     09/17/2019    CO2 24 09/17/2019    BUN 8 09/17/2019    CREATININE 0.46 09/17/2019    GFRAA >60 09/17/2019    LABGLOM >60 09/17/2019    GLUCOSE 89 09/17/2019    PROT 6.9 09/17/2019    CALCIUM 9.5 09/17/2019    BILITOT 0.15 09/17/2019    ALKPHOS 113 09/17/2019    AST 14 09/17/2019    ALT 9 09/17/2019       POC Tests: No results for input(s): POCGLU, POCNA, POCK, POCCL, POCBUN, POCHEMO, POCHCT in the last 72 hours.     Coags: No results found for: PROTIME, INR, APTT    HCG (If Applicable):   Lab Results   Component Value Date    HCG NEGATIVE 01/11/2021        ABGs: No results found for: PHART, PO2ART, ATQ9SBB, ESR3NKM, BEART, M1LLJRCD     Type & Screen (If Applicable):  No results found for: LABABO, LABRH    Drug/Infectious Status (If Applicable):  No results found for: HIV, HEPCAB    COVID-19 Screening (If Applicable):   Lab Results   Component Value Date    COVID19 Not Detected 01/06/2021         Anesthesia Evaluation  Patient summary reviewed no history of anesthetic complications:   Airway: Mallampati: II  TM distance: >3 FB   Neck ROM: full  Mouth opening: > = 3 FB Dental: normal exam         Pulmonary:Negative Pulmonary ROS and normal exam    (+) current smoker                          ROS comment: vapes THC once a day. None today   Cardiovascular:  Exercise tolerance: good (>4 METS),                     Neuro/Psych:   (+) psychiatric history: stable with treatmentdepression/anxiety             GI/Hepatic/Renal: Neg GI/Hepatic/Renal ROS            Endo/Other: Negative Endo/Other ROS                    Abdominal:           Vascular: negative vascular ROS. Anesthesia Plan      general     ASA 2       Induction: intravenous. MIPS: Postoperative opioids intended and Prophylactic antiemetics administered. Anesthetic plan and risks discussed with patient.       Plan discussed with surgical team.                  STEPHEN Leon - CRNA   1/11/2021 Never

## (undated) DEVICE — BNDG,ELSTC,MATRIX,STRL,3"X5YD,LF,HOOK&LP: Brand: MEDLINE

## (undated) DEVICE — GAUZE,SPONGE,FLUFF,6"X6.75",STRL,5/TRAY: Brand: MEDLINE

## (undated) DEVICE — STANDARD (U) BLADE ASSEMBLY 1PK: Brand: MICROAIRE®

## (undated) DEVICE — SOLUTION IV IRRIG POUR BRL 0.9% SODIUM CHL 2F7124

## (undated) DEVICE — ZIMMER® STERILE DISPOSABLE TOURNIQUET CUFF WITH PLC, DUAL PORT, SINGLE BLADDER, 18 IN. (46 CM)

## (undated) DEVICE — GLOVE ORANGE PI 7   MSG9070

## (undated) DEVICE — BANDAGE,ELASTIC,ESMARK,STERILE,4"X9',LF: Brand: MEDLINE

## (undated) DEVICE — Z DISCONTINUED GLOVE SURG SZ 7.5 L12IN FNGR THK13MIL WHT ISOLEX

## (undated) DEVICE — WEBRIL* CAST PADDING: Brand: DEROYAL

## (undated) DEVICE — PADDING,UNDERCAST,COTTON, 3X4YD STERILE: Brand: MEDLINE

## (undated) DEVICE — GLOVE ORANGE PI 7 1/2   MSG9075

## (undated) DEVICE — SUTURE ETHLN SZ 3-0 L18IN NONABSORBABLE BLK L24MM FS-1 3/8 663G

## (undated) DEVICE — PACK PROCEDURE SURG EXTREMITY MIN